# Patient Record
Sex: FEMALE | Race: WHITE | URBAN - METROPOLITAN AREA
[De-identification: names, ages, dates, MRNs, and addresses within clinical notes are randomized per-mention and may not be internally consistent; named-entity substitution may affect disease eponyms.]

---

## 2017-05-02 ENCOUNTER — APPOINTMENT (RX ONLY)
Dept: URBAN - METROPOLITAN AREA CLINIC 24 | Facility: CLINIC | Age: 82
Setting detail: DERMATOLOGY
End: 2017-05-02

## 2017-05-02 DIAGNOSIS — L30.9 DERMATITIS, UNSPECIFIED: ICD-10-CM

## 2017-05-02 DIAGNOSIS — L57.0 ACTINIC KERATOSIS: ICD-10-CM

## 2017-05-02 DIAGNOSIS — L82.1 OTHER SEBORRHEIC KERATOSIS: ICD-10-CM

## 2017-05-02 DIAGNOSIS — L81.4 OTHER MELANIN HYPERPIGMENTATION: ICD-10-CM

## 2017-05-02 DIAGNOSIS — D69.2 OTHER NONTHROMBOCYTOPENIC PURPURA: ICD-10-CM

## 2017-05-02 DIAGNOSIS — L85.3 XEROSIS CUTIS: ICD-10-CM

## 2017-05-02 DIAGNOSIS — D22 MELANOCYTIC NEVI: ICD-10-CM

## 2017-05-02 PROBLEM — D22.5 MELANOCYTIC NEVI OF TRUNK: Status: ACTIVE | Noted: 2017-05-02

## 2017-05-02 PROBLEM — E03.9 HYPOTHYROIDISM, UNSPECIFIED: Status: ACTIVE | Noted: 2017-05-02

## 2017-05-02 PROCEDURE — 99213 OFFICE O/P EST LOW 20 MIN: CPT | Mod: 25

## 2017-05-02 PROCEDURE — ? PRESCRIPTION

## 2017-05-02 PROCEDURE — 17003 DESTRUCT PREMALG LES 2-14: CPT

## 2017-05-02 PROCEDURE — 17000 DESTRUCT PREMALG LESION: CPT

## 2017-05-02 PROCEDURE — ? COUNSELING

## 2017-05-02 PROCEDURE — ? TREATMENT REGIMEN

## 2017-05-02 PROCEDURE — ? LIQUID NITROGEN

## 2017-05-02 RX ORDER — TRIAMCINOLONE ACETONIDE 1 MG/G
CREAM TOPICAL
Qty: 1 | Refills: 3 | Status: ERX

## 2017-05-02 ASSESSMENT — LOCATION SIMPLE DESCRIPTION DERM
LOCATION SIMPLE: LOWER BACK
LOCATION SIMPLE: NOSE
LOCATION SIMPLE: CHEST
LOCATION SIMPLE: RIGHT UPPER BACK
LOCATION SIMPLE: LEFT UPPER BACK
LOCATION SIMPLE: LEFT FOREARM
LOCATION SIMPLE: LEFT POSTERIOR UPPER ARM
LOCATION SIMPLE: RIGHT FOREARM

## 2017-05-02 ASSESSMENT — LOCATION DETAILED DESCRIPTION DERM
LOCATION DETAILED: LEFT DISTAL DORSAL FOREARM
LOCATION DETAILED: RIGHT MEDIAL UPPER BACK
LOCATION DETAILED: LEFT PROXIMAL DORSAL FOREARM
LOCATION DETAILED: LEFT DISTAL POSTERIOR UPPER ARM
LOCATION DETAILED: LEFT LATERAL UPPER BACK
LOCATION DETAILED: RIGHT DISTAL DORSAL FOREARM
LOCATION DETAILED: NASAL DORSUM
LOCATION DETAILED: SUPERIOR LUMBAR SPINE
LOCATION DETAILED: LEFT MEDIAL SUPERIOR CHEST

## 2017-05-02 ASSESSMENT — LOCATION ZONE DERM
LOCATION ZONE: NOSE
LOCATION ZONE: ARM
LOCATION ZONE: TRUNK

## 2017-05-02 NOTE — PROCEDURE: LIQUID NITROGEN
Post-Care Instructions: I reviewed with the patient in detail post-care instructions. Patient is to wear sunprotection, and avoid picking at any of the treated lesions. Pt may apply Vaseline to crusted or scabbing areas.
Duration Of Freeze Thaw-Cycle (Seconds): 4
Detail Level: Simple
Consent: The patient's consent was obtained including but not limited to risks of crusting, scabbing, blistering, scarring, darker or lighter pigmentary change, recurrence, incomplete removal and infection.
Render Post-Care Instructions In Note?: no
Number Of Freeze-Thaw Cycles: 1 freeze-thaw cycle

## 2017-05-22 ENCOUNTER — HOSPITAL ENCOUNTER (OUTPATIENT)
Dept: SURGERY | Age: 82
Discharge: HOME | End: 2017-05-22
Payer: MEDICARE

## 2017-05-22 ENCOUNTER — HOSPITAL ENCOUNTER (OUTPATIENT)
Dept: PHYSICAL THERAPY | Age: 82
Discharge: HOME | End: 2017-05-22
Payer: MEDICARE

## 2017-05-22 VITALS
RESPIRATION RATE: 16 BRPM | TEMPERATURE: 96.2 F | DIASTOLIC BLOOD PRESSURE: 65 MMHG | HEART RATE: 64 BPM | BODY MASS INDEX: 20.99 KG/M2 | HEIGHT: 65 IN | SYSTOLIC BLOOD PRESSURE: 130 MMHG | WEIGHT: 126 LBS | OXYGEN SATURATION: 99 %

## 2017-05-22 LAB
ANION GAP BLD CALC-SCNC: 8 MMOL/L (ref 7–16)
APPEARANCE UR: CLEAR
APTT PPP: 24.8 SEC (ref 23.5–31.7)
BACTERIA SPEC CULT: NORMAL
BASOPHILS # BLD AUTO: 0 K/UL (ref 0–0.2)
BASOPHILS # BLD: 0 % (ref 0–2)
BILIRUB UR QL: NEGATIVE
BUN SERPL-MCNC: 35 MG/DL (ref 8–23)
CALCIUM SERPL-MCNC: 9.9 MG/DL (ref 8.3–10.4)
CHLORIDE SERPL-SCNC: 102 MMOL/L (ref 98–107)
CO2 SERPL-SCNC: 29 MMOL/L (ref 21–32)
COLOR UR: YELLOW
CREAT SERPL-MCNC: 1.47 MG/DL (ref 0.6–1)
DIFFERENTIAL METHOD BLD: NORMAL
EOSINOPHIL # BLD: 0.1 K/UL (ref 0–0.8)
EOSINOPHIL NFR BLD: 2 % (ref 0.5–7.8)
ERYTHROCYTE [DISTWIDTH] IN BLOOD BY AUTOMATED COUNT: 14.3 % (ref 11.9–14.6)
GLUCOSE SERPL-MCNC: 87 MG/DL (ref 65–100)
GLUCOSE UR STRIP.AUTO-MCNC: NEGATIVE MG/DL
HCT VFR BLD AUTO: 38.5 % (ref 35.8–46.3)
HGB BLD-MCNC: 12.4 G/DL (ref 11.7–15.4)
HGB UR QL STRIP: NEGATIVE
IMM GRANULOCYTES # BLD: 0 K/UL (ref 0–0.5)
IMM GRANULOCYTES NFR BLD AUTO: 0.2 % (ref 0–5)
INR PPP: 1 (ref 0.9–1.2)
KETONES UR QL STRIP.AUTO: NEGATIVE MG/DL
LEUKOCYTE ESTERASE UR QL STRIP.AUTO: NEGATIVE
LYMPHOCYTES # BLD AUTO: 33 % (ref 13–44)
LYMPHOCYTES # BLD: 2.1 K/UL (ref 0.5–4.6)
MCH RBC QN AUTO: 29 PG (ref 26.1–32.9)
MCHC RBC AUTO-ENTMCNC: 32.2 G/DL (ref 31.4–35)
MCV RBC AUTO: 90.2 FL (ref 79.6–97.8)
MONOCYTES # BLD: 0.5 K/UL (ref 0.1–1.3)
MONOCYTES NFR BLD AUTO: 8 % (ref 4–12)
NEUTS SEG # BLD: 3.6 K/UL (ref 1.7–8.2)
NEUTS SEG NFR BLD AUTO: 57 % (ref 43–78)
NITRITE UR QL STRIP.AUTO: NEGATIVE
PH UR STRIP: 6.5 [PH] (ref 5–9)
PLATELET # BLD AUTO: 232 K/UL (ref 150–450)
PMV BLD AUTO: 11.5 FL (ref 10.8–14.1)
POTASSIUM SERPL-SCNC: 4.4 MMOL/L (ref 3.5–5.1)
PROT UR STRIP-MCNC: NEGATIVE MG/DL
PROTHROMBIN TIME: 10.3 SEC (ref 9.6–12)
RBC # BLD AUTO: 4.27 M/UL (ref 4.05–5.25)
SERVICE CMNT-IMP: NORMAL
SODIUM SERPL-SCNC: 139 MMOL/L (ref 136–145)
SP GR UR REFRACTOMETRY: 1.01 (ref 1–1.02)
UROBILINOGEN UR QL STRIP.AUTO: 0.2 EU/DL (ref 0.2–1)
WBC # BLD AUTO: 6.4 K/UL (ref 4.3–11.1)

## 2017-05-22 PROCEDURE — 80048 BASIC METABOLIC PNL TOTAL CA: CPT | Performed by: PHYSICIAN ASSISTANT

## 2017-05-22 PROCEDURE — 93005 ELECTROCARDIOGRAM TRACING: CPT | Performed by: ANESTHESIOLOGY

## 2017-05-22 PROCEDURE — 77030027138 HC INCENT SPIROMETER -A

## 2017-05-22 PROCEDURE — G8980 MOBILITY D/C STATUS: HCPCS

## 2017-05-22 PROCEDURE — G8979 MOBILITY GOAL STATUS: HCPCS

## 2017-05-22 PROCEDURE — 97162 PT EVAL MOD COMPLEX 30 MIN: CPT

## 2017-05-22 PROCEDURE — 81003 URINALYSIS AUTO W/O SCOPE: CPT | Performed by: PHYSICIAN ASSISTANT

## 2017-05-22 PROCEDURE — 85025 COMPLETE CBC W/AUTO DIFF WBC: CPT | Performed by: PHYSICIAN ASSISTANT

## 2017-05-22 PROCEDURE — 85730 THROMBOPLASTIN TIME PARTIAL: CPT | Performed by: PHYSICIAN ASSISTANT

## 2017-05-22 PROCEDURE — 87641 MR-STAPH DNA AMP PROBE: CPT | Performed by: PHYSICIAN ASSISTANT

## 2017-05-22 PROCEDURE — G8978 MOBILITY CURRENT STATUS: HCPCS

## 2017-05-22 PROCEDURE — 85610 PROTHROMBIN TIME: CPT | Performed by: PHYSICIAN ASSISTANT

## 2017-05-22 RX ORDER — FERROUS SULFATE, DRIED 160(50) MG
1 TABLET, EXTENDED RELEASE ORAL DAILY
COMMUNITY

## 2017-05-22 NOTE — PERIOP NOTES
Received nephrology note dated 3/30/17 from [facility] and placed on chart to reference DOS if needed.

## 2017-05-22 NOTE — PERIOP NOTES
Patient verified name, , and surgery as listed in [facility]. Type 3 surgery, Joint camp assessment complete. Labs per surgeon: cbc,bmp,pt,ptt,ua,mrsa swab ; results (within anesthesia protocols)  Labs per anesthesia protocol: included in surgeon's orders. EKG:done today - will have MDA review once comparison received from [facility]. Most recent card note (5/15/17) not ready yet under Care Everywhere - will log as a problem chart to review and update pt hx if needed. Most recent [facility] ([phone]) note requested to have if needed DOS. Most recent pacer interrogation under Care Everywhere and on chart (17). Hibiclens and instructions given per hospital policy. Nasal Swab collected per MD order and instructions for Mupirocin nasal ointment if required. Patient provided with handouts including Guide to Surgery, Pain Management, Hand Hygiene, Blood Transfusion Education, and [facility] [facility] Brochure. Patient answered medical/surgical history questions at their best of ability. All prior to admission medications documented in [facility] Care. Original medication prescription bottle were visualized during patient appointment. Patient instructed to hold all vitamins 7 days prior to surgery and NSAIDS 5 days prior to surgery. Medications to be held prior to surgery  - none    Patient instructed to continue previous medications as prescribed prior to surgery and to take the following medications the day of surgery according to anesthesia guidelines with a small sip of water: tyl prn, asa 81 mg, zebeta, synthroid, prilosec, tramadol prn. Patient taught back and verbalized understanding.

## 2017-05-22 NOTE — PROGRESS NOTES
05/22/17 1200   Oxygen Therapy   O2 Sat (%) 98 %   Pulse via Oximetry 77 beats per minute   O2 Device Room air   Pre-Treatment   Breath Sounds Bilateral Clear   Pre FEV1 (liters) 1.2 liters   % Predicted 62   Incentive Spirometry Treatment   Actual Volume (ml) 1000 ml     Initial respiratory Assessment completed with pt. Pt was interviewed and evaluated in Joint camp prior to surgery. Patient ID:  Leanne Dailey  [id number]  80 y.o.  5/4/1930  Surgeon: Dr. Anthony Mock  Date of Surgery: 6/12/2017  Procedure: Total Right Hip Arthroplasty  Primary Care Physician: Elisa Flores [place] [phone]  Specialists:                                  Pt instructed in the use of Incentive Spirometry. Pt instructed to bring Incentive Spirometer back on date of surgery & to start using Is upon return to pt room.     Pt taught proper cough technique      History of smoking:   NONE      Quit date:    Secondhand smoke:FATHER      Past procedures with Oxygen desaturation:NONE    Past Medical History:   Diagnosis Date    Arthritis     Cancer ([facility] [address]     breast (right)    CKD (chronic kidney disease)     Dyslipidemia     GERD (gastroesophageal reflux disease)     Hx: UTI (urinary tract infection)     Hypertension     LBBB (left bundle branch block)     Pacemaker 2009    [facility]    SSS (sick sinus syndrome) ([facility] [address]     Thyroid disease                                                                                                                                                      Respiratory history:                                    DENIES SOB                                  Respiratory meds:                                                         FAMILY PRESENT:             YES       -                                      PAST SLEEP STUDY:              NO  HX OF ELVIA:                              NO                                     ELVIA assessment:                                               SLEEPS ON SIDE PHYSICAL EXAM   Body mass index is 20.97 kg/(m^2).    Visit Vitals    /65 (BP 1 Location: Left arm, BP Patient Position: At rest)    Pulse 64    Temp 96.2 °F (35.7 °C)    Resp 16    Ht 5' 5\" (1.651 m)    Wt 57.2 kg (126 lb)    SpO2 99%    BMI 20.97 kg/m2     Neck circumference: 33.5     cm    Loud snoring: STATES PT SNORES BUT NOT WORRISOME  Witnessed apnea or wakening gasping or choking:,DENIES   Awakens with headaches:DENIES    Morning or daytime tiredness/ sleepiness: DENIES     Dry mouth or sore throat in morning:SOME  Freidman stage:2-3    SACS score:4    STOP/BAN                              CPAP:NA         NONE  Referrals:    Pt. Phone Number:

## 2017-05-22 NOTE — PROGRESS NOTES
Tory Beck  : [birth year](68 y.o.) [facility] at [address], [place] [facility] [address]  [phone]       Physical Therapy Prehab Plan of Treatment and Evaluation Summary:2017    ICD-10: Treatment Diagnosis:   · Pain in Right Hip (M25.551)  · Stiffness of Right Hip, Not elsewhere classified (M25.651)  · Difficulty in walking, Not elsewhere classified (R26.2)  · Other abnormalities of gait and mobility (R26.89)  Precautions/Allergies: Other medication and Tiazac [diltiazem hcl]  MEDICAL/REFERRING DIAGNOSIS:  Unilateral primary osteoarthritis, right hip [M16.11]  REFERRING PHYSICIAN: Jose M Laird., *  DATE OF SURGERY: 17   Assessment:   Comments:  Pain in right hip joint with decreased independence with functional mobility. Pt plans to return home following hospital stay.  present and supportive. PROBLEM LIST (Impacting functional limitations):  Ms. Leila Jimenez presents with the following right lower extremity(s) problems:  1. Transfers  2. Gait  3. Strength  4. Range of Motion  5. Pain   INTERVENTIONS PLANNED:  1. Home Exercise Program  2. Educational Discussion     TREATMENT PLAN: Effective Dates: 2017 TO 2017. Frequency/Duration: Patient to continue to perform home exercise program at least twice per day up until her surgery. GOALS: (Goals have been discussed and agreed upon with patient.)  Discharge Goals: Time Frame: 1 Day  1. Patient will demonstrate independence with a home exercise program designed to increase strength, range of motion and pain control to minimize functional deficits and optimize patient for total joint replacement. Rehabilitation Potential For Stated Goals: Good  Regarding Clayton Skaggs's therapy, I certify that the treatment plan above will be carried out by a therapist or under their direction.   Thank you for this referral,  Martin Vance, PT               HISTORY:   Present Symptoms:  Pain Intensity 1: 0 History of Present Injury/Illness (Reason for Referral):  Medical/Referring Diagnosis: Unilateral primary osteoarthritis, right hip [M16.11]   Past Medical History/Comorbidities:   Ms. Melodie Garza  has a past medical history of Arthritis; Cancer [facility]); CKD (chronic kidney disease); Dyslipidemia; GERD (gastroesophageal reflux disease); UTI (urinary tract infection); Hypertension; LBBB (left bundle branch block); Pacemaker (2009); SSS (sick sinus syndrome) ([address] and Thyroid disease. She also has no past medical history of Adverse effect of anesthesia; Aneurysm ([address] Asthma; Autoimmune disease ([facility] [address] CAD (coronary artery disease); Chronic obstructive pulmonary disease ([address] Coagulation disorder ([facility] [address] Diabetes ([address] Difficult intubation; Endocarditis; Heart failure ([address] Liver disease; Malignant hyperthermia due to anesthesia; Nausea & vomiting; Nicotine vapor product user; Non-nicotine vapor product user; Pseudocholinesterase deficiency; Psychiatric disorder; PUD (peptic ulcer disease); Rheumatic fever; Seizures ([address] Sleep apnea; Stroke [facility]); or Thromboembolus ([address] Ms. Melodie Garza  has a past surgical history that includes pacemaker; hysterectomy (01/1974); hernia repair (05/24/2011); cataract removal (Bilateral); breast surgery procedure unlisted; bunionectomy (07/28/2006); knee replacement (Bilateral); thyroidectomy (2009); knee arthroscopy (Bilateral); carpal tunnel release (Left, 2003); heart catheterization (09/2001); and pacemaker placement (07/15/2009).   Social History/Living Environment:   Home Environment: Private residence  # Steps to Enter: 2  One/Two Story Residence: Two story, live on 1st floor  # of Interior Steps: 16  Support Systems: Spouse/Significant Other/Partner  Patient Expects to be Discharged to[employer] residence  Current DME Used/Available at [facility]: Shower chair, Commode, bedside, Walker, rolling, Cane, straight  Tub or Shower Type: Tub  Work/Activity:  retired  Dominant Side:  RIGHT  Current Medications:  See Pre-assessment nursing note   Number of Personal Factors/Comorbidities that affect the Plan of Care: 1-2: MODERATE COMPLEXITY   EXAMINATION:   ADLs (Current Functional Status):   Ambulation:  [] Independent  [] Walk Indoors Only  [x] Walk Outdoors  [x] Use Assistive Device walker  [] Use Wheelchair Only Dressing:  [x] Independent  Requires Assistance from Someone for:  [] Sock/Shoes  [] Pants  [] Everything   Bathing/Showering:   [] Independent  [x] Requires Assistance from Someone  [] Sponge Bath Only Household Activities:  [] Routine house and yard work  [x] Light Housework Only  [] None   Observation/Orthostatic Postural Assessment:   Within defined limits   ROM/Flexibility:   Gross Assessment: Yes  AROM: Generally decreased, functional                LLE Assessment  LLE Assessment (WDL): Within defined limits      RLE AROM  R Hip Flexion: 90   Strength:   Gross Assessment: Yes  Strength: Generally decreased, functional                  Functional Mobility:    Gross Assessment: Yes  Coordination: Generally decreased, functional    Gait Description (WDL): Within defined limits  Stand to Sit: Independent  Sit to Stand: Independent             Balance:    Sitting: Intact  Standing: Intact   Body Structures Involved:  1. Bones  2. Joints  3. Muscles Body Functions Affected:  1. Neuromusculoskeletal  2. Movement Related Activities and Participation Affected:  1. Mobility   Number of elements that affect the Plan of Care: 4+: HIGH COMPLEXITY   CLINICAL PRESENTATION:   Presentation: Stable and uncomplicated: LOW COMPLEXITY   CLINICAL DECISION MAKING:   Outcome Measure: Tool Used: Lower Extremity Functional Scale (LEFS)  Score:  Initial: 13/80 Most Recent: X/80 (Date: -- )   Interpretation of Score: 20 questions each scored on a 5 point scale with 0 representing \"extreme difficulty or unable to perform\" and 4 representing \"no difficulty\". The lower the score, the greater the functional disability. 80/80 represents no disability. Minimal detectable change is 9 points. Score 80 79-65 64-49 48-33 32-17 16-1 0   Modifier CH CI CJ CK CL CM CN     ? Mobility - Walking and Moving Around:     - CURRENT STATUS: CM - 80%-99% impaired, limited or restricted    - GOAL STATUS: CM - 80%-99% impaired, limited or restricted    - D/C STATUS:  CM - 80%-99% impaired, limited or restricted  Medical Necessity:   · Ms. Syeda Blank is expected to optimize her lower extremity strength and ROM in preparation for joint replacement surgery. Reason for Services/Other Comments:  · Achieve baseline assesment of musculoskeletal system, functional mobility and home environment. , educate in PT HEP in preparation for surgery, educate in hospital plan of care. Use of outcome tool(s) and clinical judgement create a POC that gives a: Questionable prediction of patient's progress: MODERATE COMPLEXITY   TREATMENT:   Treatment/Session Assessment:  Patient was instructed in PT- HEP to increase strength and ROM in LEs. Answered all questions. · Post session pain:  9  · Compliance with Program/Exercises: compliant most of the time.   Total Treatment Duration:  PT Patient Time In/Time Out  Time In: 1300  Time Out: 63 Savanna Bergman PT

## 2017-05-22 NOTE — ADVANCED PRACTICE NURSE
Total Joint Surgery Preoperative Chart Review      Patient ID:  Taniya Roach  [id number]  80 y.o.  5/4/1930  Surgeon: Dr. Davidson Almeida  Date of Surgery: 6/12/2017  Procedure: Total Right Hip Arthroplasty  Primary Care Physician: Tatiana Nunez [phone]  Specialty Physician(s):      Subjective:   Taniya Roach is a 80 y.o. WHITE OR  female who presents for preoperative evaluation for Total Right Hip arthroplasty. This is a preoperative chart review note based on data collected by the nurse at the surgical Pre-Assessment visit. Past Medical History:   Diagnosis Date    Arthritis     Cancer [facility])     breast (right)    CKD (chronic kidney disease)     Dyslipidemia     GERD (gastroesophageal reflux disease)     Hx: UTI (urinary tract infection)     Hypertension     LBBB (left bundle branch block)     [facility]emaker 2009    [facility]    SSS (sick sinus syndrome) ([facility] [address]     Thyroid disease       Past Surgical History:   Procedure Laterality Date    BREAST SURGERY PROCEDURE UNLISTED      x 3 from [id number] for breast cancer    HX BUNIONECTOMY  07/28/2006    HX CARPAL TUNNEL RELEASE Left 2003    HX CATARACT REMOVAL Bilateral     1/7/2003, 2/25/2003    HX HEART CATHETERIZATION  09/2001    HX HERNIA REPAIR  05/24/2011    HX HYSTERECTOMY  01/1974    HX KNEE ARTHROSCOPY Bilateral     HX KNEE REPLACEMENT Bilateral     right (2/2005) and left (2/2006)    HX PACEMAKER      HX PACEMAKER PLACEMENT  07/15/2009    HX THYROIDECTOMY  2009     Family History   Problem Relation Age of Onset    Heart Disease Mother       Social History   Substance Use Topics    Smoking status: Never Smoker    Smokeless tobacco: Not on file    Alcohol use No       Prior to Admission medications    Medication Sig Start Date End Date Taking? Authorizing Provider   calcium-vitamin D (OYSTER SHELL) 500 mg(1,250mg) -200 unit per tablet Take 1 Tab by mouth daily.    Yes Historical Provider   ACETAMINOPHEN (TYLENOL PO) Take  by mouth as needed. Take / use AM day of surgery  per anesthesia protocols if needed. Yes Historical Provider   levothyroxine (SYNTHROID) 100 mcg tablet Take 100 mcg by mouth Daily (before breakfast). Take / use AM day of surgery  per anesthesia protocols. Yes Historical Provider   amLODIPine-benazepril (LOTREL) 10-20 mg per capsule Take 1 capsule by mouth daily. Yes Historical Provider   traMADol (ULTRAM) 50 mg tablet Take 50 mg by mouth every six (6) hours as needed for Pain. Take / use AM day of surgery  per anesthesia protocols if needed. Yes Historical Provider   pravastatin (PRAVACHOL) 10 mg tablet Take 80 mg by mouth nightly. Yes Historical Provider   omeprazole (PRILOSEC) 20 mg capsule Take 20 mg by mouth daily. Take / use AM day of surgery  per anesthesia protocols. Yes Historical Provider   alendronate (FOSAMAX) 70 mg tablet Take 70 mg by mouth Every Saturday. 12/18/10  Yes Historical Provider   bisoprolol (ZEBETA) 10 mg tablet Take 10 mg by mouth daily. Take / use AM day of surgery  per anesthesia protocols. Yes Historical Provider   aspirin 81 mg tablet Take 81 mg by mouth daily. Take / use AM day of surgery  per anesthesia protocols. 12/13/10  Yes Historical Provider   ezetimibe (ZETIA) 10 mg tablet Take 10 mg by mouth every evening.  12/13/10  Yes Historical Provider     Allergies   Allergen Reactions    Other Medication Rash     Arthritis medication    Tiazac [Diltiazem Hcl] Rash          Objective:     Physical Exam:   Patient Vitals for the past 24 hrs:   Temp Pulse Resp BP SpO2   05/22/17 1348 96.2 °F (35.7 °C) 64 16 130/65 99 %       ECG:    EKG Results     None          Data Review:   Labs:   Recent Results (from the past 24 hour(s))   CBC WITH AUTOMATED DIFF    Collection Time: 05/22/17 12:10 PM   Result Value Ref Range    WBC 6.4 4.3 - 11.1 K/uL    RBC 4.27 4.05 - 5.25 M/uL    HGB 12.4 11.7 - 15.4 g/dL    HCT 38.5 35.8 - 46.3 %    MCV 90.2 79.6 - 97.8 FL    MCH 29.0 26.1 - 32.9 PG MCHC 32.2 31.4 - 35.0 g/dL    RDW 14.3 11.9 - 14.6 %    PLATELET 432 154 - 594 K/uL    MPV 11.5 10.8 - 14.1 FL    DF AUTOMATED      NEUTROPHILS 57 43 - 78 %    LYMPHOCYTES 33 13 - 44 %    MONOCYTES 8 4.0 - 12.0 %    EOSINOPHILS 2 0.5 - 7.8 %    BASOPHILS 0 0.0 - 2.0 %    IMMATURE GRANULOCYTES 0.2 0.0 - 5.0 %    ABS. NEUTROPHILS 3.6 1.7 - 8.2 K/UL    ABS. LYMPHOCYTES 2.1 0.5 - 4.6 K/UL    ABS. MONOCYTES 0.5 0.1 - 1.3 K/UL    ABS. EOSINOPHILS 0.1 0.0 - 0.8 K/UL    ABS. BASOPHILS 0.0 0.0 - 0.2 K/UL    ABS. IMM. GRANS. 0.0 0.0 - 0.5 K/UL   PROTHROMBIN TIME + INR    Collection Time: 05/22/17 12:10 PM   Result Value Ref Range    Prothrombin time 10.3 9.6 - 12.0 sec    INR 1.0 0.9 - 1.2     PTT    Collection Time: 05/22/17 12:10 PM   Result Value Ref Range    aPTT 24.8 23.5 - 45.8 SEC   METABOLIC PANEL, BASIC    Collection Time: 05/22/17 12:10 PM   Result Value Ref Range    Sodium 139 136 - 145 mmol/L    Potassium 4.4 3.5 - 5.1 mmol/L    Chloride 102 98 - 107 mmol/L    CO2 29 21 - 32 mmol/L    Anion gap 8 7 - 16 mmol/L    Glucose 87 65 - 100 mg/dL    BUN 35 (H) 8 - 23 MG/DL    Creatinine 1.47 (H) 0.6 - 1.0 MG/DL    GFR est AA 43 (L) >60 ml/min/1.73m2    GFR est non-AA 36 (L) >60 ml/min/1.73m2    Calcium 9.9 8.3 - 10.4 MG/DL   URINALYSIS W/ RFLX MICROSCOPIC    Collection Time: 05/22/17 12:18 PM   Result Value Ref Range    Color YELLOW      Appearance CLEAR      Specific gravity 1.013 1.001 - 1.023      pH (UA) 6.5 5.0 - 9.0      Protein NEGATIVE  NEG mg/dL    Glucose NEGATIVE  mg/dL    Ketone NEGATIVE  NEG mg/dL    Bilirubin NEGATIVE  NEG      Blood NEGATIVE  NEG      Urobilinogen 0.2 0.2 - 1.0 EU/dL    Nitrites NEGATIVE  NEG      Leukocyte Esterase NEGATIVE  NEG           Problem List:  )There is no problem list on file for this patient. Total Joint Surgery Pre-Assessment Recommendations:           Renal protocol initiated. The patient's chart will be flagged renal risk. Renal RisK Alerts:  1.  Caution with use of muscle relaxants and sedatives with reduced renal function  2. Caution with total amount of narcotics used   3. Avoid morphine if patient has reduced renal function due to accumulations of the highly active metabolite, morphine-6-glucuronide, which can lead to sedation and respiratory depression  4. Avoid nephrotoxic drugs such as CALDERON inhibitors  5. Consider volume status monitoring in addition to Dickinson  6.  Ensure hand-off to hospitalist for appropriate perioperative IV fluid management        Signed By: Phoebe Corrigan NP-C    May 22, 2017

## 2017-05-23 LAB
ATRIAL RATE: 65 BPM
CALCULATED P AXIS, ECG09: 64 DEGREES
CALCULATED R AXIS, ECG10: 13 DEGREES
CALCULATED T AXIS, ECG11: 56 DEGREES
DIAGNOSIS, 93000: NORMAL
P-R INTERVAL, ECG05: 216 MS
Q-T INTERVAL, ECG07: 434 MS
QRS DURATION, ECG06: 88 MS
QTC CALCULATION (BEZET), ECG08: 451 MS
VENTRICULAR RATE, ECG03: 65 BPM

## 2017-05-23 NOTE — PERIOP NOTES
Pati Talley, anesthesia, reviewed and ok'd for surgery stress (1/27/16), cath (9/25/01), ekg's (5/22/17, 5/7/12) - no new orders received. OK to proceed.

## 2017-06-01 NOTE — H&P
[id number] [facility]  Pre Operative History and Physical Exam    Patient ID:  Sahra Post  [id number]  63 y.o.  5/4/1930    Today: June 1, 2017       Assessment:   1. Arthritis of the Right Hip    I have reviewed the patient's controlled substance prescription history, as maintained in the [place] prescription monitoring program, so that the prescription/s for a controlled substance can be given. Plan:    1. Proceed with scheduled Procedure(s) (LRB):  RIGHT HIP ARTHROPLASTY TOTAL / DEPUY (Right)            CC:  Right hip pain    HPI:   The patient has end stage arthritis of the right hip. The patient was evaluated and examined during a consultation prior to this office visit. There have been no changes to the patient's orthopedic condition since the initial consultation. The patient has failed previous conservative treatment for this condition including antiinflammatories , and lifestyle modifications. The necessity for joint replacement is present.  The patient will be admitted the day of surgery for Procedure(s) (LRB):  RIGHT HIP ARTHROPLASTY TOTAL / Ghada Live (Right)      Past Medical/Surgical History:  Past Medical History:   Diagnosis Date    Arthritis     Cancer ([address]     breast (right)    CKD (chronic kidney disease)     Dyslipidemia     GERD (gastroesophageal reflux disease)     Hx: UTI (urinary tract infection)     Hypertension     LBBB (left bundle branch block)     Pacemaker 2009    [facility]    SSS (sick sinus syndrome) ([address]     Thyroid disease      Past Surgical History:   Procedure Laterality Date    BREAST SURGERY PROCEDURE UNLISTED      x 3 from [id number] for breast cancer    HX BUNIONECTOMY  07/28/2006    HX CARPAL TUNNEL RELEASE Left 2003    HX CATARACT REMOVAL Bilateral     1/7/2003, 2/25/2003    HX HEART CATHETERIZATION  09/2001    HX HERNIA REPAIR  05/24/2011    HX HYSTERECTOMY  01/1974    HX KNEE ARTHROSCOPY Bilateral     HX KNEE REPLACEMENT Bilateral right (2/2005) and left (2/2006)    HX PACEMAKER      HX PACEMAKER PLACEMENT  07/15/2009    HX THYROIDECTOMY  2009        Allergies: Allergies   Allergen Reactions    Other Medication Rash     Arthritis medication    Tiazac [Diltiazem Hcl] Rash        Objective:                    HEENT: NC/AT                   Lungs:  Unlabored respirations, clear breath sounds                    Heart:   RRR                    Abdomen: soft                   Extremities:  Pain with ROM of the right hip    Meds:   No current facility-administered medications for this encounter. Current Outpatient Prescriptions   Medication Sig    calcium-vitamin D (OYSTER SHELL) 500 mg(1,250mg) -200 unit per tablet Take 1 Tab by mouth daily.  ACETAMINOPHEN (TYLENOL PO) Take  by mouth as needed. Take / use AM day of surgery  per anesthesia protocols if needed.  levothyroxine (SYNTHROID) 100 mcg tablet Take 100 mcg by mouth Daily (before breakfast). Take / use AM day of surgery  per anesthesia protocols.  amLODIPine-benazepril (LOTREL) 10-20 mg per capsule Take 1 capsule by mouth daily.  traMADol (ULTRAM) 50 mg tablet Take 50 mg by mouth every six (6) hours as needed for Pain. Take / use AM day of surgery  per anesthesia protocols if needed.  pravastatin (PRAVACHOL) 10 mg tablet Take 80 mg by mouth nightly.  omeprazole (PRILOSEC) 20 mg capsule Take 20 mg by mouth daily. Take / use AM day of surgery  per anesthesia protocols.  alendronate (FOSAMAX) 70 mg tablet Take 70 mg by mouth Every Saturday.  bisoprolol (ZEBETA) 10 mg tablet Take 10 mg by mouth daily. Take / use AM day of surgery  per anesthesia protocols.  aspirin 81 mg tablet Take 81 mg by mouth daily. Take / use AM day of surgery  per anesthesia protocols.  ezetimibe (ZETIA) 10 mg tablet Take 10 mg by mouth every evening.          Labs:  Hospital Outpatient Visit on 05/22/2017   Component Date Value Ref Range Status    WBC 05/22/2017 6.4  4.3 - 11.1 K/uL Final    RBC 05/22/2017 4.27  4.05 - 5.25 M/uL Final    HGB 05/22/2017 12.4  11.7 - 15.4 g/dL Final    HCT 05/22/2017 38.5  35.8 - 46.3 % Final    MCV 05/22/2017 90.2  79.6 - 97.8 FL Final    MCH 05/22/2017 29.0  26.1 - 32.9 PG Final    MCHC 05/22/2017 32.2  31.4 - 35.0 g/dL Final    RDW 05/22/2017 14.3  11.9 - 14.6 % Final    PLATELET 07/96/7081 686  150 - 450 K/uL Final    MPV 05/22/2017 11.5  10.8 - 14.1 FL Final    DF 05/22/2017 AUTOMATED    Final    NEUTROPHILS 05/22/2017 57  43 - 78 % Final    LYMPHOCYTES 05/22/2017 33  13 - 44 % Final    MONOCYTES 05/22/2017 8  4.0 - 12.0 % Final    EOSINOPHILS 05/22/2017 2  0.5 - 7.8 % Final    BASOPHILS 05/22/2017 0  0.0 - 2.0 % Final    IMMATURE GRANULOCYTES 05/22/2017 0.2  0.0 - 5.0 % Final    ABS. NEUTROPHILS 05/22/2017 3.6  1.7 - 8.2 K/UL Final    ABS. LYMPHOCYTES 05/22/2017 2.1  0.5 - 4.6 K/UL Final    ABS. MONOCYTES 05/22/2017 0.5  0.1 - 1.3 K/UL Final    ABS. EOSINOPHILS 05/22/2017 0.1  0.0 - 0.8 K/UL Final    ABS. BASOPHILS 05/22/2017 0.0  0.0 - 0.2 K/UL Final    ABS. IMM. GRANS. 05/22/2017 0.0  0.0 - 0.5 K/UL Final    Prothrombin time 05/22/2017 10.3  9.6 - 12.0 sec Final    INR 05/22/2017 1.0  0.9 - 1.2   Final    Comment: Suggested therapeutic INR range:  Venous thrombosis and embolus  2.0-3.0  Prosthetic heart valve         2.5-3.5      aPTT 05/22/2017 24.8  23.5 - 31.7 SEC Final    Heparin Therapeutic Range = 56.6-81.7 secs    Sodium 05/22/2017 139  136 - 145 mmol/L Final    Potassium 05/22/2017 4.4  3.5 - 5.1 mmol/L Final    Chloride 05/22/2017 102  98 - 107 mmol/L Final    CO2 05/22/2017 29  21 - 32 mmol/L Final    Anion gap 05/22/2017 8  7 - 16 mmol/L Final    Glucose 05/22/2017 87  65 - 100 mg/dL Final    Comment: 47 - 60 mg/dl Consistent with, but not fully diagnostic of hypoglycemia.   101 - 125 mg/dl Impaired fasting glucose/consistent with pre-diabetes mellitus  > 126 mg/dl Fasting glucose consistent with overt diabetes mellitus      BUN 05/22/2017 35* 8 - 23 MG/DL Final    Creatinine 05/22/2017 1.47* 0.6 - 1.0 MG/DL Final    GFR est AA 05/22/2017 43* >60 ml/min/1.73m2 Final    GFR est non-AA 05/22/2017 36* >60 ml/min/1.73m2 Final    Comment: (NOTE)  Estimated GFR is calculated using the Modification of Diet in Renal   Disease (MDRD) Study equation, reported for both  Americans   (GFRAA) and non- Americans (GFRNA), and normalized to 1.73m2   body surface area. The physician must decide which value applies to   the patient. The MDRD study equation should only be used in   individuals age 25 or older. It has not been validated for the   following: pregnant women, patients with serious comorbid conditions,   or on certain medications, or persons with extremes of body size,   muscle mass, or nutritional status.  Calcium 05/22/2017 9.9  8.3 - 10.4 MG/DL Final    Color 05/22/2017 YELLOW    Final    Appearance 05/22/2017 CLEAR    Final    Specific gravity 05/22/2017 1.013  1.001 - 1.023   Final    pH (UA) 05/22/2017 6.5  5.0 - 9.0   Final    Protein 05/22/2017 NEGATIVE   NEG mg/dL Final    Glucose 05/22/2017 NEGATIVE   mg/dL Final    Ketone 05/22/2017 NEGATIVE   NEG mg/dL Final    Bilirubin 05/22/2017 NEGATIVE   NEG   Final    Blood 05/22/2017 NEGATIVE   NEG   Final    Urobilinogen 05/22/2017 0.2  0.2 - 1.0 EU/dL Final    Nitrites 05/22/2017 NEGATIVE   NEG   Final    Leukocyte Esterase 05/22/2017 NEGATIVE   NEG   Final    Special Requests: 05/22/2017 NO SPECIAL REQUESTS    Final    Culture result: 05/22/2017 SA target not detected. A MRSA NEGATIVE, SA NEGATIVE test result does not preclude MRSA or SA nasal colonization.     Final    Ventricular Rate 05/22/2017 65  BPM Final    Atrial Rate 05/22/2017 65  BPM Final    P-R Interval 05/22/2017 216  ms Final    QRS Duration 05/22/2017 88  ms Final    Q-T Interval 05/22/2017 434  ms Final    QTC Calculation (Bezet) 05/22/2017 451  ms Final    Calculated P Axis 05/22/2017 64  degrees Final    Calculated R Axis 05/22/2017 13  degrees Final    Calculated T Axis 05/22/2017 56  degrees Final    Diagnosis 05/22/2017    Final                    Value:!!! Poor data quality, interpretation may be adversely affected  Sinus rhythm with 1st degree A-V block  Possible Left atrial enlargement  Low voltage QRS  Cannot rule out Anterior infarct , age undetermined  Abnormal ECG  When compared with ECG of 25-JUL-2006 10:58,  Minimal criteria for Anterior infarct are now Present  Confirmed by ST CHELLE ATKINSON MD (), MARCO CARLSON ([id number]) on 5/23/2017 6:40:32 AM                   There is no problem list on file for this patient.         Signed By: KAYLA Sutton  June 1, 2017

## 2017-06-01 NOTE — H&P
????? Insurance: Medicare; [employer] of [address]      Office Visit: [id number] EST Level 2 Diagnosis: O48.274 Status post total left knee replacement   M16.11 Primary osteoarthritis of right hip   Z96.651 Status post total right knee replacement   Z09 Follow-up exam   S32. 9XXA Fracture of unspecified parts of lumbosacral spine and pelvis, initial encounter for closed fracture    Proc 1: ?????  Laterality:????? Med 1: ????? Proc 2: ?????  Laterality:????? Med 2: ????? FX 1: ?????           Laterality:  ????? FX2: ?????            Laterality: ????? Casting: ? ???? Cast Supply: ? ????   DME: ????? Laterality: ? ??? DME: ????? Laterality: ? ???   XRAY RIGHT: ?????   LEFT: ????? BILAT: ????? Follow up: Surgery   ?????        BMI:  25.3  Date of Service: 2017-03-14  Work Status:  ????? Allergies:  Cephalexin (unknown); Keflex (unknown); Potassimin (unknown); Tiazac  Medications:Aspirin;Calcium + D;Diovan; Fosamax; Lotrel; Omeprazole;Pravachol;PredniSONE ([place]amonte) (10 MG, take by mouth per package instructions x 12 days); Synthroid;TraMADol HCl (50 MG, Take 1 tablet(s) by mouth every 4-6 hours as needed [PRN] NTE 6 in 24 hrs);Zebeta;Zetia    CC: ?????    11 years on the left and 12 years on the right Follow Up Bilateral TKA Tomás    History:  The patient returns today for 11 years on the left and 12 years on the right follow-up for their bilateral TKA. The patient is doing very well in regard to their bilateral knees and having no discomfort or pain. They are ambulating with no assistive device. PMH:  The [facility] patient health form was updated by the patient, reviewed and signed. ROS:  Noted on the patient form. Pertinent positives and negatives are addressed with the patient, particularly those related to musculoskeletal concerns. Non-orthopaedic concerns were referred back to the primary care physician.     Physical Exam: Bilateral Knees  On exam today, the incisions are well healed; ROM is good, 0 to 130 degrees. No effusion present. No instability to varus/valgus stress, no A/P instability. Good quad strength. No extensor lag. No flexion contracture. Patient walks with no appreciable limp. X-Rays:  AP, lateral and sunrise view of the bilateral knees reveals cemented bilateral TKA, Tomás. Good alignment and good cement technique. No evidence of loosening. X-Ray Diagnosis: SATISFACTORY APPEARANCE BILATERAL TKA    Disposition:  The patient is doing well following bilateral TKA, Tomás. They are to continue with their present level of activity and to return here in 2-3 years for follow-up. ADDENDUM    CC:  Right hip and leg pain. HISTORY:  Ms. Mauricio Bansal is an 80-YO female who is seen today on referral from Dr. Felicia Vail. She was seen by   Dr. Felicia Vail back on the 24th of January. She was seen for pain and discomfort in her right leg. She had previously been seen for problems with her lower back. She related the increased pain and discomfort in the right leg from a fall at home about 2 weeks prior to that. In fact, she has had several falls recently. She is now on a walker. Dr. Felicia Vail saw her and diagnosed her appropriately with a fracture of the right pubic and ischial rami as well as OA of the right hip. She was referred here for evaluation and treatment. The pain and discomfort in the right leg has remained about the same. She has pain associated with standing and walking. She has difficulty lifting the right leg. She has some pain and discomfort in both legs which has been attributed to significant spinal stenosis. That pain she experiences both at rest and night and she experiences it in both legs and her knees. She is becoming progressively more limited in her activities. PMH:  The [facility] Patient Health Form was filled out by the patient, reviewed with the patient and signed. This included a list of current medications, drug allergies, current medical problems, FH, SH, and ROS.   Those symptoms and problems related to HPI were discussed with the patient. All others are being managed by PCP. PE:  On exam today, the patient is alert and oriented x 3. She is ambulatory with a walker, walks with a right antalgic gait. There is no tenderness to palpation over the lower lumbar spine. There is negative SLR bilaterally, negative stretch, and a negative popliteal compression test.  Positive SLR on the left side, positive stretch, and positive popliteal compression test.  There is minimal tenderness to palpation about the right hip. There is restriction in the ROM of the right hip and marked pain, especially with attempt at internal/external rotation. On examination of the right knee, the skin is intact. There is no effusion present. There is good ROM, 0 to 125 degrees. Calf is soft, nontender. NV exam is normal.     X-RAYS:  AP pelvis, lateral right hip reveals a fracture of the right superior ramus. There is probably an associated fracture of the right ischium, although not well visualized. She has severe OA of the right hip. X-RAY DIAGNOSES: 1) Severe OA of the right hip. 2) Fracture of right pubic and ischial rami. DIAGNOSES: 1) SEVERE OA RIGHT HIP. 2) RIGHT PUBIC AND ISCHIAL RAMI FRACTURES. 3) DDD LUMBAR SPINE W/ CHRONIC LEG PAIN. MEDICAL DECISION-MAKING:  The patient is having pain and discomfort in the right hip. She probably still is having some pain related to the right pubic and ischial rami fractures, although those should be healing as it is almost 2 months following the fractures. I believe most of her pain is coming from the arthritic right hip and treatment options include continued conservative care with ambulation with a walker or considering right total hip replacement. I presented both treatment options to the patient. She would require medical clearance and then we would decide whether to proceed with right total hip replacement.   I did discuss the surgical procedure, surgical risks, and rehab time with the patient. She understands these and will consider her options. She was given total hip literature. She has a handicap parking sticker.        Electronically Signed By Arturo Blankenship MD

## 2017-06-09 ENCOUNTER — ANESTHESIA EVENT (OUTPATIENT)
Dept: SURGERY | Age: 82
DRG: 470 | End: 2017-06-09
Payer: MEDICARE

## 2017-06-12 ENCOUNTER — ANESTHESIA (OUTPATIENT)
Dept: SURGERY | Age: 82
DRG: 470 | End: 2017-06-12
Payer: MEDICARE

## 2017-06-12 ENCOUNTER — APPOINTMENT (OUTPATIENT)
Dept: GENERAL RADIOLOGY | Age: 82
DRG: 470 | End: 2017-06-12
Payer: MEDICARE

## 2017-06-12 ENCOUNTER — HOSPITAL ENCOUNTER (INPATIENT)
Age: 82
LOS: 4 days | Discharge: NURSING, REHAB OR LONG-TERM CARE | DRG: 470 | End: 2017-06-16
Attending: ORTHOPAEDIC SURGERY | Admitting: ORTHOPAEDIC SURGERY
Payer: MEDICARE

## 2017-06-12 DIAGNOSIS — Z96.641 STATUS POST RIGHT HIP REPLACEMENT: Primary | ICD-10-CM

## 2017-06-12 LAB
GLUCOSE BLD STRIP.AUTO-MCNC: 95 MG/DL (ref 65–100)
HGB BLD-MCNC: 8.3 G/DL (ref 11.7–15.4)

## 2017-06-12 PROCEDURE — 74011000250 HC RX REV CODE- 250: Performed by: ANESTHESIOLOGY

## 2017-06-12 PROCEDURE — 77030011640 HC PAD GRND REM COVD -A: Performed by: ORTHOPAEDIC SURGERY

## 2017-06-12 PROCEDURE — 72170 X-RAY EXAM OF PELVIS: CPT

## 2017-06-12 PROCEDURE — C1776 JOINT DEVICE (IMPLANTABLE): HCPCS | Performed by: ORTHOPAEDIC SURGERY

## 2017-06-12 PROCEDURE — 77030002966 HC SUT PDS J&J -A: Performed by: ORTHOPAEDIC SURGERY

## 2017-06-12 PROCEDURE — 77030034849: Performed by: ORTHOPAEDIC SURGERY

## 2017-06-12 PROCEDURE — 74011250636 HC RX REV CODE- 250/636: Performed by: ORTHOPAEDIC SURGERY

## 2017-06-12 PROCEDURE — 77030012890

## 2017-06-12 PROCEDURE — 77030006777 HC BLD SAW OSC CNMD -B: Performed by: ORTHOPAEDIC SURGERY

## 2017-06-12 PROCEDURE — 77030008459 HC STPLR SKN COOP -B: Performed by: ORTHOPAEDIC SURGERY

## 2017-06-12 PROCEDURE — 74011250636 HC RX REV CODE- 250/636: Performed by: ANESTHESIOLOGY

## 2017-06-12 PROCEDURE — 77030008467 HC STPLR SKN COVD -B: Performed by: ORTHOPAEDIC SURGERY

## 2017-06-12 PROCEDURE — 77030031139 HC SUT VCRL2 J&J -A: Performed by: ORTHOPAEDIC SURGERY

## 2017-06-12 PROCEDURE — 77030007880 HC KT SPN EPDRL BBMI -B: Performed by: ANESTHESIOLOGY

## 2017-06-12 PROCEDURE — 74011000250 HC RX REV CODE- 250: Performed by: ORTHOPAEDIC SURGERY

## 2017-06-12 PROCEDURE — 77030018836 HC SOL IRR NACL ICUM -A: Performed by: ORTHOPAEDIC SURGERY

## 2017-06-12 PROCEDURE — 86901 BLOOD TYPING SEROLOGIC RH(D): CPT | Performed by: PHYSICIAN ASSISTANT

## 2017-06-12 PROCEDURE — 85018 HEMOGLOBIN: CPT | Performed by: PHYSICIAN ASSISTANT

## 2017-06-12 PROCEDURE — 77030018547 HC SUT ETHBND1 J&J -B: Performed by: ORTHOPAEDIC SURGERY

## 2017-06-12 PROCEDURE — 77030019557 HC ELECTRD VES SEAL MEDT -F: Performed by: ORTHOPAEDIC SURGERY

## 2017-06-12 PROCEDURE — 76210000006 HC OR PH I REC 0.5 TO 1 HR: Performed by: ORTHOPAEDIC SURGERY

## 2017-06-12 PROCEDURE — 65270000029 HC RM PRIVATE

## 2017-06-12 PROCEDURE — 74011000250 HC RX REV CODE- 250

## 2017-06-12 PROCEDURE — 77010033678 HC OXYGEN DAILY

## 2017-06-12 PROCEDURE — 74011250636 HC RX REV CODE- 250/636: Performed by: PHYSICIAN ASSISTANT

## 2017-06-12 PROCEDURE — 86923 COMPATIBILITY TEST ELECTRIC: CPT | Performed by: PHYSICIAN ASSISTANT

## 2017-06-12 PROCEDURE — 97161 PT EVAL LOW COMPLEX 20 MIN: CPT

## 2017-06-12 PROCEDURE — 74011250636 HC RX REV CODE- 250/636

## 2017-06-12 PROCEDURE — 97165 OT EVAL LOW COMPLEX 30 MIN: CPT

## 2017-06-12 PROCEDURE — 77030012935 HC DRSG AQUACEL BMS -B: Performed by: ORTHOPAEDIC SURGERY

## 2017-06-12 PROCEDURE — 76060000034 HC ANESTHESIA 1.5 TO 2 HR: Performed by: ORTHOPAEDIC SURGERY

## 2017-06-12 PROCEDURE — 77030013727 HC IRR FAN PULSVC ZIMM -B: Performed by: ORTHOPAEDIC SURGERY

## 2017-06-12 PROCEDURE — 77030003665 HC NDL SPN BBMI -A: Performed by: ANESTHESIOLOGY

## 2017-06-12 PROCEDURE — 77030032490 HC SLV COMPR SCD KNE COVD -B

## 2017-06-12 PROCEDURE — 74011000258 HC RX REV CODE- 258: Performed by: ORTHOPAEDIC SURGERY

## 2017-06-12 PROCEDURE — 77030020782 HC GWN BAIR PAWS FLX 3M -B: Performed by: ANESTHESIOLOGY

## 2017-06-12 PROCEDURE — 94760 N-INVAS EAR/PLS OXIMETRY 1: CPT

## 2017-06-12 PROCEDURE — 82962 GLUCOSE BLOOD TEST: CPT

## 2017-06-12 PROCEDURE — 0SR904A REPLACEMENT OF RIGHT HIP JOINT WITH CERAMIC ON POLYETHYLENE SYNTHETIC SUBSTITUTE, UNCEMENTED, OPEN APPROACH: ICD-10-PCS | Performed by: ORTHOPAEDIC SURGERY

## 2017-06-12 PROCEDURE — 76010000161 HC OR TIME 1 TO 1.5 HR INTENSV-TIER 1: Performed by: ORTHOPAEDIC SURGERY

## 2017-06-12 DEVICE — LINER ACET OD52MM ID36MM HIP ALTRX PINN: Type: IMPLANTABLE DEVICE | Site: HIP | Status: FUNCTIONAL

## 2017-06-12 DEVICE — HEAD FEM DIA36MM +1.5MM OFFSET 12/14 TAPR HIP CERAMIC: Type: IMPLANTABLE DEVICE | Site: HIP | Status: FUNCTIONAL

## 2017-06-12 DEVICE — STEM FEM SZ 11 L125MM NK L38.5MM 135DEG 40MM OFFSET STD HIP: Type: IMPLANTABLE DEVICE | Site: HIP | Status: FUNCTIONAL

## 2017-06-12 DEVICE — ELIMINATOR H APEX FOR 48-60MM PINN HIP SHELL: Type: IMPLANTABLE DEVICE | Site: HIP | Status: FUNCTIONAL

## 2017-06-12 DEVICE — CUP ACET DIA52MM HIP GRIPTION PRI CEMENTLESS FIX 100 SER: Type: IMPLANTABLE DEVICE | Site: HIP | Status: FUNCTIONAL

## 2017-06-12 RX ORDER — LEVOTHYROXINE SODIUM 100 UG/1
100 TABLET ORAL
Status: DISCONTINUED | OUTPATIENT
Start: 2017-06-13 | End: 2017-06-16 | Stop reason: HOSPADM

## 2017-06-12 RX ORDER — PANTOPRAZOLE SODIUM 40 MG/1
40 TABLET, DELAYED RELEASE ORAL
Status: DISCONTINUED | OUTPATIENT
Start: 2017-06-13 | End: 2017-06-16 | Stop reason: HOSPADM

## 2017-06-12 RX ORDER — SODIUM CHLORIDE 0.9 % (FLUSH) 0.9 %
5-10 SYRINGE (ML) INJECTION AS NEEDED
Status: DISCONTINUED | OUTPATIENT
Start: 2017-06-12 | End: 2017-06-12 | Stop reason: HOSPADM

## 2017-06-12 RX ORDER — OXYCODONE AND ACETAMINOPHEN 5; 325 MG/1; MG/1
1 TABLET ORAL AS NEEDED
Status: DISCONTINUED | OUTPATIENT
Start: 2017-06-12 | End: 2017-06-12 | Stop reason: HOSPADM

## 2017-06-12 RX ORDER — BUPIVACAINE HYDROCHLORIDE 7.5 MG/ML
INJECTION, SOLUTION INTRASPINAL AS NEEDED
Status: DISCONTINUED | OUTPATIENT
Start: 2017-06-12 | End: 2017-06-12 | Stop reason: HOSPADM

## 2017-06-12 RX ORDER — CEFAZOLIN SODIUM IN 0.9 % NACL 2 G/50 ML
2 INTRAVENOUS SOLUTION, PIGGYBACK (ML) INTRAVENOUS EVERY 8 HOURS
Status: COMPLETED | OUTPATIENT
Start: 2017-06-12 | End: 2017-06-13

## 2017-06-12 RX ORDER — FAMOTIDINE 20 MG/1
20 TABLET, FILM COATED ORAL ONCE
Status: DISCONTINUED | OUTPATIENT
Start: 2017-06-12 | End: 2017-06-12 | Stop reason: HOSPADM

## 2017-06-12 RX ORDER — ACETAMINOPHEN 500 MG
1000 TABLET ORAL EVERY 6 HOURS
Status: DISCONTINUED | OUTPATIENT
Start: 2017-06-13 | End: 2017-06-16 | Stop reason: HOSPADM

## 2017-06-12 RX ORDER — CEFAZOLIN SODIUM IN 0.9 % NACL 2 G/50 ML
2 INTRAVENOUS SOLUTION, PIGGYBACK (ML) INTRAVENOUS ONCE
Status: COMPLETED | OUTPATIENT
Start: 2017-06-12 | End: 2017-06-12

## 2017-06-12 RX ORDER — HYDROMORPHONE HYDROCHLORIDE 2 MG/1
2 TABLET ORAL
Status: DISCONTINUED | OUTPATIENT
Start: 2017-06-12 | End: 2017-06-14

## 2017-06-12 RX ORDER — HYDROMORPHONE HYDROCHLORIDE 2 MG/ML
0.5 INJECTION, SOLUTION INTRAMUSCULAR; INTRAVENOUS; SUBCUTANEOUS
Status: DISCONTINUED | OUTPATIENT
Start: 2017-06-12 | End: 2017-06-12 | Stop reason: HOSPADM

## 2017-06-12 RX ORDER — FENTANYL CITRATE 50 UG/ML
INJECTION, SOLUTION INTRAMUSCULAR; INTRAVENOUS AS NEEDED
Status: DISCONTINUED | OUTPATIENT
Start: 2017-06-12 | End: 2017-06-12 | Stop reason: HOSPADM

## 2017-06-12 RX ORDER — ASPIRIN 325 MG
325 TABLET, DELAYED RELEASE (ENTERIC COATED) ORAL EVERY 12 HOURS
Qty: 70 TAB | Refills: 0 | Status: SHIPPED | OUTPATIENT
Start: 2017-06-12 | End: 2017-07-17

## 2017-06-12 RX ORDER — MORPHINE SULFATE 10 MG/ML
INJECTION, SOLUTION INTRAMUSCULAR; INTRAVENOUS AS NEEDED
Status: DISCONTINUED | OUTPATIENT
Start: 2017-06-12 | End: 2017-06-12 | Stop reason: HOSPADM

## 2017-06-12 RX ORDER — ASPIRIN 325 MG
325 TABLET, DELAYED RELEASE (ENTERIC COATED) ORAL EVERY 12 HOURS
Status: DISCONTINUED | OUTPATIENT
Start: 2017-06-12 | End: 2017-06-16 | Stop reason: HOSPADM

## 2017-06-12 RX ORDER — SODIUM CHLORIDE 9 MG/ML
100 INJECTION, SOLUTION INTRAVENOUS CONTINUOUS
Status: DISPENSED | OUTPATIENT
Start: 2017-06-12 | End: 2017-06-13

## 2017-06-12 RX ORDER — ONDANSETRON 2 MG/ML
4 INJECTION INTRAMUSCULAR; INTRAVENOUS
Status: DISCONTINUED | OUTPATIENT
Start: 2017-06-12 | End: 2017-06-16 | Stop reason: HOSPADM

## 2017-06-12 RX ORDER — PROPOFOL 10 MG/ML
VIAL (ML) INTRAVENOUS
Status: DISCONTINUED | OUTPATIENT
Start: 2017-06-12 | End: 2017-06-12 | Stop reason: HOSPADM

## 2017-06-12 RX ORDER — MIDAZOLAM HYDROCHLORIDE 1 MG/ML
2 INJECTION, SOLUTION INTRAMUSCULAR; INTRAVENOUS
Status: DISCONTINUED | OUTPATIENT
Start: 2017-06-12 | End: 2017-06-12 | Stop reason: HOSPADM

## 2017-06-12 RX ORDER — SODIUM CHLORIDE, SODIUM LACTATE, POTASSIUM CHLORIDE, CALCIUM CHLORIDE 600; 310; 30; 20 MG/100ML; MG/100ML; MG/100ML; MG/100ML
100 INJECTION, SOLUTION INTRAVENOUS CONTINUOUS
Status: DISCONTINUED | OUTPATIENT
Start: 2017-06-12 | End: 2017-06-12 | Stop reason: HOSPADM

## 2017-06-12 RX ORDER — BISOPROLOL FUMARATE 10 MG/1
10 TABLET ORAL DAILY
Status: DISCONTINUED | OUTPATIENT
Start: 2017-06-12 | End: 2017-06-16 | Stop reason: HOSPADM

## 2017-06-12 RX ORDER — ACETAMINOPHEN 10 MG/ML
1000 INJECTION, SOLUTION INTRAVENOUS ONCE
Status: COMPLETED | OUTPATIENT
Start: 2017-06-12 | End: 2017-06-12

## 2017-06-12 RX ORDER — SODIUM CHLORIDE 9 MG/ML
50 INJECTION, SOLUTION INTRAVENOUS CONTINUOUS
Status: DISCONTINUED | OUTPATIENT
Start: 2017-06-12 | End: 2017-06-12 | Stop reason: HOSPADM

## 2017-06-12 RX ORDER — HYDROMORPHONE HYDROCHLORIDE 2 MG/1
2 TABLET ORAL
Qty: 40 TAB | Refills: 0 | Status: SHIPPED | OUTPATIENT
Start: 2017-06-12

## 2017-06-12 RX ORDER — DIPHENHYDRAMINE HCL 25 MG
25 CAPSULE ORAL
Status: DISCONTINUED | OUTPATIENT
Start: 2017-06-12 | End: 2017-06-16 | Stop reason: HOSPADM

## 2017-06-12 RX ORDER — OXYCODONE HYDROCHLORIDE 5 MG/1
5 TABLET ORAL
Status: DISCONTINUED | OUTPATIENT
Start: 2017-06-12 | End: 2017-06-12 | Stop reason: HOSPADM

## 2017-06-12 RX ORDER — NALOXONE HYDROCHLORIDE 0.4 MG/ML
.2-.4 INJECTION, SOLUTION INTRAMUSCULAR; INTRAVENOUS; SUBCUTANEOUS
Status: DISCONTINUED | OUTPATIENT
Start: 2017-06-12 | End: 2017-06-16 | Stop reason: HOSPADM

## 2017-06-12 RX ORDER — DIPHENHYDRAMINE HYDROCHLORIDE 50 MG/ML
12.5 INJECTION, SOLUTION INTRAMUSCULAR; INTRAVENOUS ONCE
Status: DISCONTINUED | OUTPATIENT
Start: 2017-06-12 | End: 2017-06-12 | Stop reason: HOSPADM

## 2017-06-12 RX ORDER — FENTANYL CITRATE 50 UG/ML
25 INJECTION, SOLUTION INTRAMUSCULAR; INTRAVENOUS ONCE
Status: DISCONTINUED | OUTPATIENT
Start: 2017-06-12 | End: 2017-06-12 | Stop reason: HOSPADM

## 2017-06-12 RX ORDER — ROPIVACAINE HYDROCHLORIDE 2 MG/ML
INJECTION, SOLUTION EPIDURAL; INFILTRATION; PERINEURAL AS NEEDED
Status: DISCONTINUED | OUTPATIENT
Start: 2017-06-12 | End: 2017-06-12 | Stop reason: HOSPADM

## 2017-06-12 RX ORDER — LIDOCAINE HYDROCHLORIDE 10 MG/ML
0.1 INJECTION INFILTRATION; PERINEURAL AS NEEDED
Status: DISCONTINUED | OUTPATIENT
Start: 2017-06-12 | End: 2017-06-12 | Stop reason: HOSPADM

## 2017-06-12 RX ORDER — SODIUM CHLORIDE 0.9 % (FLUSH) 0.9 %
5-10 SYRINGE (ML) INJECTION EVERY 8 HOURS
Status: DISCONTINUED | OUTPATIENT
Start: 2017-06-12 | End: 2017-06-12 | Stop reason: HOSPADM

## 2017-06-12 RX ORDER — SODIUM CHLORIDE 0.9 % (FLUSH) 0.9 %
5-10 SYRINGE (ML) INJECTION AS NEEDED
Status: DISCONTINUED | OUTPATIENT
Start: 2017-06-12 | End: 2017-06-16 | Stop reason: HOSPADM

## 2017-06-12 RX ORDER — MIDAZOLAM HYDROCHLORIDE 1 MG/ML
2 INJECTION, SOLUTION INTRAMUSCULAR; INTRAVENOUS ONCE
Status: DISCONTINUED | OUTPATIENT
Start: 2017-06-12 | End: 2017-06-12 | Stop reason: HOSPADM

## 2017-06-12 RX ORDER — AMLODIPINE AND BENAZEPRIL HYDROCHLORIDE 10; 20 MG/1; MG/1
1 CAPSULE ORAL DAILY
Status: DISCONTINUED | OUTPATIENT
Start: 2017-06-12 | End: 2017-06-12 | Stop reason: RX

## 2017-06-12 RX ORDER — DEXAMETHASONE SODIUM PHOSPHATE 100 MG/10ML
10 INJECTION INTRAMUSCULAR; INTRAVENOUS ONCE
Status: DISPENSED | OUTPATIENT
Start: 2017-06-13 | End: 2017-06-14

## 2017-06-12 RX ORDER — AMOXICILLIN 250 MG
2 CAPSULE ORAL DAILY
Status: DISCONTINUED | OUTPATIENT
Start: 2017-06-13 | End: 2017-06-16 | Stop reason: HOSPADM

## 2017-06-12 RX ORDER — HYDROMORPHONE HYDROCHLORIDE 1 MG/ML
1 INJECTION, SOLUTION INTRAMUSCULAR; INTRAVENOUS; SUBCUTANEOUS
Status: DISCONTINUED | OUTPATIENT
Start: 2017-06-12 | End: 2017-06-14

## 2017-06-12 RX ORDER — NEOMYCIN AND POLYMYXIN B SULFATES 40; 200000 MG/ML; [USP'U]/ML
SOLUTION IRRIGATION AS NEEDED
Status: DISCONTINUED | OUTPATIENT
Start: 2017-06-12 | End: 2017-06-12 | Stop reason: HOSPADM

## 2017-06-12 RX ORDER — ALENDRONATE SODIUM 10 MG/1
70 TABLET ORAL
Status: DISCONTINUED | OUTPATIENT
Start: 2017-06-17 | End: 2017-06-12

## 2017-06-12 RX ORDER — ONDANSETRON 2 MG/ML
INJECTION INTRAMUSCULAR; INTRAVENOUS AS NEEDED
Status: DISCONTINUED | OUTPATIENT
Start: 2017-06-12 | End: 2017-06-12 | Stop reason: HOSPADM

## 2017-06-12 RX ORDER — SODIUM CHLORIDE 0.9 % (FLUSH) 0.9 %
5-10 SYRINGE (ML) INJECTION EVERY 8 HOURS
Status: DISCONTINUED | OUTPATIENT
Start: 2017-06-12 | End: 2017-06-16 | Stop reason: HOSPADM

## 2017-06-12 RX ADMIN — ONDANSETRON 4 MG: 2 INJECTION INTRAMUSCULAR; INTRAVENOUS at 08:36

## 2017-06-12 RX ADMIN — CEFAZOLIN 2 G: 1 INJECTION, POWDER, FOR SOLUTION INTRAMUSCULAR; INTRAVENOUS; PARENTERAL at 07:41

## 2017-06-12 RX ADMIN — LIDOCAINE HYDROCHLORIDE 0.1 ML: 10 INJECTION, SOLUTION INFILTRATION; PERINEURAL at 06:22

## 2017-06-12 RX ADMIN — Medication 10 MCG/KG/MIN: at 07:59

## 2017-06-12 RX ADMIN — SODIUM CHLORIDE, SODIUM LACTATE, POTASSIUM CHLORIDE, AND CALCIUM CHLORIDE 1000 ML: 600; 310; 30; 20 INJECTION, SOLUTION INTRAVENOUS at 06:21

## 2017-06-12 RX ADMIN — ACETAMINOPHEN 1000 MG: 10 INJECTION, SOLUTION INTRAVENOUS at 18:18

## 2017-06-12 RX ADMIN — SODIUM CHLORIDE, SODIUM LACTATE, POTASSIUM CHLORIDE, AND CALCIUM CHLORIDE: 600; 310; 30; 20 INJECTION, SOLUTION INTRAVENOUS at 08:23

## 2017-06-12 RX ADMIN — CEFAZOLIN 2 G: 1 INJECTION, POWDER, FOR SOLUTION INTRAMUSCULAR; INTRAVENOUS; PARENTERAL at 18:19

## 2017-06-12 RX ADMIN — BUPIVACAINE HYDROCHLORIDE 1.8 ML: 7.5 INJECTION, SOLUTION INTRASPINAL at 07:52

## 2017-06-12 RX ADMIN — SODIUM CHLORIDE 100 ML/HR: 900 INJECTION, SOLUTION INTRAVENOUS at 18:19

## 2017-06-12 RX ADMIN — SODIUM CHLORIDE, SODIUM LACTATE, POTASSIUM CHLORIDE, AND CALCIUM CHLORIDE: 600; 310; 30; 20 INJECTION, SOLUTION INTRAVENOUS at 07:30

## 2017-06-12 RX ADMIN — Medication 5 ML: at 18:19

## 2017-06-12 RX ADMIN — FENTANYL CITRATE 25 MCG: 50 INJECTION, SOLUTION INTRAMUSCULAR; INTRAVENOUS at 08:08

## 2017-06-12 RX ADMIN — FENTANYL CITRATE 25 MCG: 50 INJECTION, SOLUTION INTRAMUSCULAR; INTRAVENOUS at 07:47

## 2017-06-12 NOTE — PROGRESS NOTES
Problem: Mobility Impaired (Adult and Pediatric)  Goal: *Acute Goals and Plan of Care (Insert Text)  GOALS (1-4 days):  (1.)Ms. Danny De Luna will move from supine to sit and sit to supine in bed with CONTACT GUARD ASSIST.   (2.)Ms. Danny De Luna will transfer from bed to chair and chair to bed with CONTACT GUARD ASSIST using the least restrictive device. (3.)Ms. Danny De Luna will ambulate with CONTACT GUARD ASSIST for 150 feet with the least restrictive device. (4.)Ms. Skaggs will ambulate up/down 4 steps with bilateral railing with MINIMAL ASSIST with no device. (5.)Ms. Skaggs will state/observe EDA precautions with 0 verbal cues. ________________________________________________________________________________________________      PHYSICAL THERAPY JOINT CAMP EDA: INITIAL ASSESSMENT, AM 6/12/2017  INPATIENT: Hospital Day: 1  [place] / [place] / Product Type: Medicare /      NAME/AGE/GENDER: Linette Stuart is a 80 y.o. female       PRIMARY DIAGNOSIS:  Primary osteoarthritis of right hip [M16.11]              Procedure(s) and Anesthesia Type:     * RIGHT HIP ARTHROPLASTY TOTAL / Reeder Ode - Spinal (Right)  ICD-10: Treatment Diagnosis:        · Pain in Right Hip (M25.551)  · Stiffness of Right Hip, Not elsewhere classified (M25.651)  · Difficulty in walking, Not elsewhere classified (R26.2)  · Other abnormalities of gait and mobility (R26.89)       ASSESSMENT:      Ms. Danny De Luna presents S/P R EDA. She is a bit lethargic. She presents with decreased R LE strength and ROm,s tandign balance, functional mobility and EDA awareness. She does have some cognitive issues Her spouse is a little tearful and appears overwhelmed. This section established at most recent assessment   PROBLEM LIST (Impairments causing functional limitations):  1. Decreased Strength  2. Decreased Transfer Abilities  3. Decreased Ambulation Ability/Technique  4. Decreased Balance  5. Increased Pain  6.  Decreased Activity Tolerance  7. Increased Fatigue  8. Decreased Flexibility/Joint Mobility  9. Decreased Knowledge of Precautions  10. Decreased [place] with Home Exercise Program    INTERVENTIONS PLANNED: (Benefits and precautions of physical therapy have been discussed with the patient.)  1. Balance Exercise  2. Bed Mobility  3. Cold  4. Gait Training  5. Home Exercise Program (HEP)  6. Therapeutic Exercise/Strengthening  7. Transfer Training  8. EDA education  9. Range of Motion: active/assisted/passive  10. Therapeutic Activities  11. Group Therapy      TREATMENT PLAN: Frequency/Duration: Follow patient BID   to address above goals. Rehabilitation Potential For Stated Goals: Good      RECOMMENDED REHABILITATION/EQUIPMENT: (at time of discharge pending progress): Continue Skilled Therapy and Home Health: Physical Therapy. HISTORY:   History of Present Injury/Illness (Reason for Referral):  S/P R EDA  Past Medical History/Comorbidities:   Ms. Sheri Akhtar  has a past medical history of Arthritis; Cancer [facility]); CKD (chronic kidney disease); Dyslipidemia; GERD (gastroesophageal reflux disease); UTI (urinary tract infection); Hypertension; LBBB (left bundle branch block); Pacemaker (2009); SSS (sick sinus syndrome) ([address] Status post right hip replacement (6/12/2017); and Thyroid disease. She also has no past medical history of Adverse effect of anesthesia; Aneurysm ([address] Asthma; Autoimmune disease ([address] CAD (coronary artery disease); Chronic obstructive pulmonary disease ([address] Coagulation disorder ([address] Diabetes ([address] Difficult intubation; Endocarditis; Heart failure ([address] Liver disease; Malignant hyperthermia due to anesthesia; Nausea & vomiting; Nicotine vapor product user; Non-nicotine vapor product user; Pseudocholinesterase deficiency; Psychiatric disorder; PUD (peptic ulcer disease); Rheumatic fever; Seizures ([address] Sleep apnea; Stroke [facility]); or Thromboembolus ([address]   Ms. Sheri Akhtar  has a past surgical history that includes pacemaker; hysterectomy (01/1974); hernia repair (05/24/2011); cataract removal (Bilateral); breast surgery procedure unlisted; bunionectomy (07/28/2006); knee replacement (Bilateral); thyroidectomy (2009); knee arthroscopy (Bilateral); carpal tunnel release (Left, 2003); heart catheterization (09/2001); and pacemaker placement (07/15/2009). Social History/Living Environment:   Home Environment: Private residence  # Steps to Enter: 2  One/Two Story Residence: Two story  # of Interior Steps: 12  Interior Rails: Left  Living Alone: No  Support Systems: Spouse/Significant Other/Partner  Patient Expects to be Discharged to[de-identified] Private residence  Current DME Used/Available at Home: [employer] or Shower Type: Tub  Prior Level of Function/Work/Activity:  Functionally independent with ADLs and amb   Number of Personal Factors/Comorbidities that affect the Plan of Care: 0: LOW COMPLEXITY   EXAMINATION:   Most Recent Physical Functioning:   Gross Assessment: Yes  Gross Assessment  AROM: Within functional limits (L LE)  Strength: Generally decreased, functional (L LE 3+/5)  Coordination: Generally decreased, functional (BUE)  Tone: Normal  Sensation: Intact (L LE)         RLE AROM  R Hip Flexion: 70  R Hip ABduction: 15        RLE Strength  R Hip Flexion: 2  R Hip ABduction: 2  R Knee Extension: 2+  R Ankle Dorsiflexion: 2+     Bed Mobility  Rolling: Minimum assistance  Supine to Sit: Moderate assistance  Sit to Supine: Moderate assistance     Transfers  Sit to Stand: Moderate assistance;Assist x2  Stand to Sit: Moderate assistance;Assist x2  Bed to Chair: Maximum assistance;Assist x2     Balance  Sitting: High guard  Standing: Pull to stand; With support                Weight Bearing Status  Right Side Weight Bearing: As tolerated  Distance (ft): 2 Feet (ft)  Ambulation - Level of Assistance:  Moderate assistance;Assist x2  Assistive Device: Walker, rolling  Base of Support: Narrowed  Speed/Janet: Slow  Step Length: Left shortened  Stance: Right decreased  Gait Abnormalities: Antalgic; Step to gait  Interventions: Safety awareness training; Tactile cues; Verbal cues      Braces/Orthotics:              Body Structures Involved:  1. Bones  2. Joints  3. Muscles Body Functions Affected:  1. Neuromusculoskeletal  2. Movement Related Activities and Participation Affected:  1. Mobility  2. Self Care   Number of elements that affect the Plan of Care: 4+: HIGH COMPLEXITY   CLINICAL PRESENTATION:   Presentation: Stable and uncomplicated: LOW COMPLEXITY   CLINICAL DECISION MAKIN[address] [place] [address] AM-PAC 6 Clicks   Basic Mobility Inpatient Short Form  How much difficulty does the patient currently have. .. Unable A Lot A Little None   1. Turning over in bed (including adjusting bedclothes, sheets and blankets)? [ ] 1   [ ] 2   [x ] 3   [ ] 4   2. Sitting down on and standing up from a chair with arms ( e.g., wheelchair, bedside commode, etc.)   [ ] 1   [x ] 2   [ ] 3   [ ] 4   3. Moving from lying on back to sitting on the side of the bed? [ ] 1   [x ] 2   [ ] 3   [ ] 4   How much help from another person does the patient currently need. .. Total A Lot A Little None   4. Moving to and from a bed to a chair (including a wheelchair)? [ ] 1   [x ] 2   [ ] 3   [ ] 4   5. Need to walk in hospital room? [x ] 1   [ ] 2   [ ] 3   [ ] 4   6. Climbing 3-5 steps with a railing? [x ] 1   [ ] 2   [ ] 3   [ ] 4   © , Trustees of [address] [place] [address], under license to [employer]. All rights reserved       Score:  [id number] Most Recent: X (Date: -- )     Interpretation of Tool:  Represents activities that are increasingly more difficult (i.e. Bed mobility, Transfers, Gait). Score 24 23 22-20 19-15 14-10 9-7 6       Modifier CH CI CJ CK CL CM CN        ?  Mobility - Walking and Moving Around:     - CURRENT STATUS: CL - 60%-79% impaired, limited or restricted    - GOAL STATUS: CK - 40%-59% impaired, limited or restricted    - D/C STATUS:  ---------------To be determined---------------  [place] / Product Type: Medicare /       Medical Necessity:     · Patient demonstrates fair rehab potential due to higher previous functional level. Reason for Services/Other Comments:  · Patient continues to require present interventions due to patient's inability to perform functional mobility independently. Use of outcome tool(s) and clinical judgement create a POC that gives a: Clear prediction of patient's progress: LOW COMPLEXITY                 TREATMENT:   (In addition to Assessment/Re-Assessment sessions the following treatments were rendered)      Pre-treatment Symptoms/Complaints:  Feels dizzy  Pain: Initial:   Pain Intensity 1: 0  Post Session:  0      Assessment/Reassessment only, no treatment provided today        Date:   Date:    Date:      ACTIVITY/EXERCISE AM PM AM PM AM PM   GROUP THERAPY  [ ]  [ ]  [ ]  [ ]  [ ]  [ ]   Ankle Pumps               Quad Sets               Gluteal Sets               Hip ABd/ADduction               Straight Leg Raises               Knee Slides               Short Arc Quads               Long Arc Quads               Chair Slides                               B = bilateral; AA = active assistive; A = active; P = passive       Treatment/Session Assessment:         Response to Treatment:  Became dizzy as we got up. Returned to bed and she felt better. RN informed of situation. .     Education:  [ ] Home Exercises  [x ] Fall Precautions  [x ] Hip Precautions [ ] Going Home Video  [ ] Knee/Hip Prosthesis Review  [x ] Walker Management/Safety [ ] Adaptive Equipment as Needed         Interdisciplinary Collaboration:   · Physical Therapist  · Occupational Therapist  · Registered Nurse     After treatment position/precautions:   · Supine in bed  · Bed/Chair-wheels locked  · Bed in low position  · Call light within reach  · RN notified  · Family at bedside  · Side rails x 3     Compliance with Program/Exercises: Will assess as treatment progresses. Recommendations/Intent for next treatment session:  Treatment next visit will focus on increasing Ms. Skaggs's independence with bed mobility, transfers, gait training, strength/ROM exercises, modalities for pain, and patient education.        Total Treatment Duration:  PT Patient Time In/Time Out  Time In: 1105  Time Out: 210 Britni Cherry, PT

## 2017-06-12 NOTE — CONSULTS
MD Boyd   Medical Director  [address] [place] [address], [facility]  Tel: [phone]     Physical Medicine & Rehabilitation Note-consult    Patient: Issac Fabry MRN: [id number]  SSN: [id number]    YOB: 1930  Age: 80 y.o. Sex: female      Admit Date: 6/12/2017  Admitting Physician: Anne Ojeda MD    Medical Decision Making/Plan/Recommend: Gait impairment/ functional deficits s/p right EDA. Patient tolerating PT session well. No major setbacks. Patient expected to make steady gains to allow safe community d/c. Continued rehab at home via [facility] PT would be reasonable and necessary. Continue PT, OT for right hip ROM, strengthening, mobility, transfers, and gait training. Will follow progress. Chief Complaint : Gait dysfunction secondary to below. Admit Diagnosis: Primary osteoarthritis of right hip [M16.11]  right total hip arthroplasty   6/12/2017  Pain  DVT risk  Post op hemorrhagic anemia  CKD (chronic kidney disease)  Arthritis  Acute Rehab Dx:  Gait impairment  Mobility and ambulation deficits  Self Care/ADL deficits    Medical Dx:  Past Medical History:   Diagnosis Date    Arthritis     Cancer ([address]     breast (right)    CKD (chronic kidney disease)     Dyslipidemia     GERD (gastroesophageal reflux disease)     Hx: UTI (urinary tract infection)     Hypertension     LBBB (left bundle branch block)     Pacemaker 2009    [facility]    SSS (sick [facility] syndrome) [facility]     Status post right hip replacement 6/12/2017    Thyroid disease      Subjective:     Date of Evaluation:  June 12, 2017    HPI: Issac Fabry is a 80 y.o. female patient at [address] [place] [address] who was admitted on 6/12/2017  by Anne Ojeda MD with below mentioned medical history, is being seen for Physical Medicine and Rehabilitation consult. Issac Fabry had painful right hip due to severe DJD.  The symptoms were refractory to conservative treatment . She underwent a right total hip arthroplasty per Dr. Yesenia Reed MD on 6/12/2017. The patient's post operative course has been uncomplicated. We are consulted to assist with rehab needs and placement. Therapy tolerated well so far. Patient's is to be WBAT RLE. Hip precautions to be followed strictly for RLE. Patient shows no major barriers. She is still limited by   right hip pain, decreased ROM and strength. Aisha Lopez is seen and examined today. Medical Records reviewed. She denies any other deficits. She is independent with ambulation at her baseline. Prior Level of Function/Work/Activity:  Functionally independent with ADLs and amb    Current Level of Function:  bed mobility - mod A, transfers - modA, decreased balance, ambulation - 2' with mod A using a RW       Family History   Problem Relation Age of Onset    Heart Disease Mother       Social History   Substance Use Topics    Smoking status: Never Smoker    Smokeless tobacco: Not on file    Alcohol use No     Past Surgical History:   Procedure Laterality Date    BREAST SURGERY PROCEDURE UNLISTED      x 3 from [id number] for breast cancer    HX BUNIONECTOMY  07/28/2006    HX CARPAL TUNNEL RELEASE Left 2003    HX CATARACT REMOVAL Bilateral     1/7/2003, 2/25/2003    HX HEART CATHETERIZATION  09/2001    HX HERNIA REPAIR  05/24/2011    HX HYSTERECTOMY  01/1974    HX KNEE ARTHROSCOPY Bilateral     HX KNEE REPLACEMENT Bilateral     right (2/2005) and left (2/2006)    HX PACEMAKER      HX PACEMAKER PLACEMENT  07/15/2009    HX THYROIDECTOMY  2009      Prior to Admission medications    Medication Sig Start Date End Date Taking? Authorizing Provider   aspirin delayed-release 325 mg tablet Take 1 Tab by mouth every twelve (12) hours every twelve (12) hours for 35 days. 6/12/17 7/17/17 Yes KAYLA Weebr   HYDROmorphone (DILAUDID) 2 mg tablet Take 1 Tab by mouth every four (4) hours as needed.  Max Daily Amount: 12 mg. 6/12/17  Yes KAYLA Meredith   levothyroxine (SYNTHROID) 100 mcg tablet Take 100 mcg by mouth Daily (before breakfast). Take / use AM day of surgery  per anesthesia protocols. Yes Historical Provider   traMADol (ULTRAM) 50 mg tablet Take 50 mg by mouth every six (6) hours as needed for Pain. Take / use AM day of surgery  per anesthesia protocols if needed. Yes Historical Provider   omeprazole (PRILOSEC) 20 mg capsule Take 20 mg by mouth daily. Take / use AM day of surgery  per anesthesia protocols. Yes Historical Provider   bisoprolol (ZEBETA) 10 mg tablet Take 10 mg by mouth daily. Take / use AM day of surgery  per anesthesia protocols. Yes Historical Provider   aspirin 81 mg tablet Take 81 mg by mouth daily. Take / use AM day of surgery  per anesthesia protocols. 12/13/10  Yes Historical Provider   calcium-vitamin D (OYSTER SHELL) 500 mg(1,250mg) -200 unit per tablet Take 1 Tab by mouth daily. Historical Provider   ACETAMINOPHEN (TYLENOL PO) Take  by mouth as needed. Take / use AM day of surgery  per anesthesia protocols if needed. Historical Provider   amLODIPine-benazepril (LOTREL) 10-20 mg per capsule Take 1 capsule by mouth daily. Historical Provider   pravastatin (PRAVACHOL) 10 mg tablet Take 80 mg by mouth nightly. Historical Provider   alendronate (FOSAMAX) 70 mg tablet Take 70 mg by mouth Every Saturday. 12/18/10   Historical Provider   ezetimibe (ZETIA) 10 mg tablet Take 10 mg by mouth every evening. 12/13/10   Historical Provider     Allergies   Allergen Reactions    Other Medication Rash     Arthritis medication    Tiazac [Diltiazem Hcl] Rash        Review of Systems: +right hip pain. Denies chest pain, shortness of breath, cough, headache, visual problems, abdominal pain, dysurea, calf pain. Pertinent positives are as noted in the medical records and unremarkable otherwise. Objective:     Vitals:  Blood pressure 119/59, pulse 67, temperature 95.3 °F (35.2 °C), resp. rate 16, height 5' 5\" (1.651 m), weight 126 lb (57.2 kg), SpO2 93 %. Temp (24hrs), Av.8 °F (36 °C), Min:95.3 °F (35.2 °C), Max:98.3 °F (36.8 °C)    BMI (calculated): 21 (17 0835)   Intake and Output:   0701 -  1900  In: 1700 [I.V.:1700]  Out: 900 [Urine:700]    Physical Exam:   General: Alert and age appropriately oriented. No acute cardio respiratory distress. HEENT: Normocephalic, no conjunctival pallor, no scleral icterus  Oral mucosa moist without cyanosis   Lungs: Clear to auscultation bilaterally. Respiration even and unlabored   Heart: Regular rate and rhythm, S1, S2   No  murmurs, clicks, rub or gallops   Abdomen: Soft, non-tender, non-distended. Genitourinary: defered   Neuromuscular:      Grossly no focal motor deficits. Right knee extension strong. Right ankle dorsiflexion 5/5  Right ankle plantarflexion 5/5  No sensory deficits distally BLE to soft touch. Skin/extremity: Calves non tender BLE. No rashes, no marginal erythema. Labs/Studies:  Recent Results (from the past 72 hour(s))   TYPE & SCREEN    Collection Time: 17  5:50 AM   Result Value Ref Range    Crossmatch Expiration 06/15/2017     ABO/Rh(D) A POSITIVE     Antibody screen NEG    GLUCOSE, POC    Collection Time: 17  6:30 AM   Result Value Ref Range    Glucose (POC) 95 65 - 100 mg/dL       Functional Assessment:  Reviewed participation and progress in therapies  Gross Assessment  AROM: Within functional limits (L LE) (17 1136)  Strength: Generally decreased, functional (L LE 3+/5) (17 1136)  Coordination: Generally decreased, functional (BUE) (17 1134)  Tone: Normal (17 1134)  Sensation: Intact (L LE) (17 1136)   Bed Mobility  Rolling: Minimum assistance (17 1136)  Supine to Sit: Moderate assistance (17 1136)  Sit to Supine:  Moderate assistance (17 9704) Balance  Sitting: High guard (06/12/17 1136)  Standing: Pull to stand; With support (06/12/17 1136)               Bed/Mat Mobility  Rolling: Minimum assistance (06/12/17 1136)  Supine to Sit: Moderate assistance (06/12/17 1136)  Sit to Supine: Moderate assistance (06/12/17 1136)  Sit to Stand: Moderate assistance;Assist x2 (06/12/17 1136)  Bed to Chair: Maximum assistance;Assist x2 (06/12/17 1134)     Ambulation:  Gait  Base of Support: Narrowed (06/12/17 1136)  Speed/Janet: Slow (06/12/17 1136)  Step Length: Left shortened (06/12/17 1136)  Stance: Right decreased (06/12/17 1136)  Gait Abnormalities: Antalgic; Step to gait (06/12/17 1136)  Ambulation - Level of Assistance: Moderate assistance;Assist x2 (06/12/17 1136)  Distance (ft): 2 Feet (ft) (06/12/17 1136)  Assistive Device: Walker, rolling (06/12/17 1136)  Interventions: Safety awareness training; Tactile cues; Verbal cues (06/12/17 1136)    Impression/Plan:     Principal Problem:    Status post right hip replacement (6/12/2017)        Current Facility-Administered Medications   Medication Dose Route Frequency Provider Last Rate Last Dose    bisoprolol (ZEBETA) tablet 10 mg  10 mg Oral DAILY Frutoso Escamilla, [place]        [START ON 6/13/2017] levothyroxine (SYNTHROID) tablet 100 mcg  100 mcg Oral ACB Frutoso Escamilla, [place]        [START ON 6/13/2017] pantoprazole (PROTONIX) tablet 40 mg  40 mg Oral ACB Frutoso Escamilla, [place]        0.9% sodium chloride infusion  100 mL/hr IntraVENous CONTINUOUS Frutoso Escamilla,  mL/hr at 06/12/17 1819 100 mL/hr at 06/12/17 1819    sodium chloride (NS) flush 5-10 mL  5-10 mL IntraVENous Q8H Frutoso Escamilla, PA   5 mL at 06/12/17 1819    sodium chloride (NS) flush 5-10 mL  5-10 mL IntraVENous PRN Frutoso Escamilla, PA        ceFAZolin in 0.9% NS (ANCEF) IVPB soln 2 g  2 g IntraVENous Q8H KAYLA Escalona 100 mL/hr at 06/12/17 1819 2 g at 06/12/17 1819    [START ON 6/13/2017] acetaminophen (TYLENOL) tablet 1,000 mg  1,000 mg Oral Q6H Renny Keller        HYDROmorphone (DILAUDID) tablet 2 mg  2 mg Oral Q4H PRN Tim Kellerma        HYDROmorphone (PF) (DILAUDID) injection 1 mg  1 mg IntraVENous Q3H PRN KAYLA Keller        naloxone [facility]) injection 0.2-0.4 mg  0.2-0.4 mg IntraVENous Q10MIN PRN KAYAL Keller        [START ON 6/13/2017] dexamethasone (DECADRON) injection 10 mg  10 mg IntraVENous ONCE Tim Kellerma        ondansetron [facility] injection 4 mg  4 mg IntraVENous Q4H PRN KAYLA Keller        diphenhydrAMINE (BENADRYL) capsule 25 mg  25 mg Oral Q4H PRN KAYLA Keller        [START ON 6/13/2017] senna-docusate (PERICOLACE) 8.6-50 mg per tablet 2 Tab  2 Tab Oral DAILY Renny Keller        aspirin delayed-release tablet 325 mg  325 mg Oral Q12H [facility]        [START ON 6/13/2017] tuberculin injection 5 Units  5 Units [facility] [place]        [START ON 6/13/2017] amLODIPine/benazepril (LOTREL) 10/20 mg   Oral DAILY Kayode Raygoza MD            Recommendations: Recommend  PT. Continue Acute Rehab Program. Continue gait training, transfer training, balance activities. Coordination of rehab/medical care. Will follow along with you for PM&R issues and provide you follow up. Rehabilitation Management/ Medical Management: 1. Devices:Walkers, Type: Rolling Walker  2. Consult:Rehab team including PT, OT,  and . 3. Disposition Rehab-discussed with patient. 4. Thigh-high or knee-high DEAN's when out of bed. 5. DVT Prophylaxis - aspirin 325mg bid x 30days. 6. Incentive spirometer Q1H while awake  7. Post op hemorrhagic anemia- monitor. Asymptomatic. 8. Activity: WBAT RLE, resume total hip precautions. 9. Planned Labs: CBC,BMP  10. Pain Control: fair control. Continue scheduled tylenol, celebrex and  PRN meds. Continue current management. 11. Wound Care: Keep surgical wound clean and dry and reinforce dressing PRN. May remove Aquacel 1 week post op ad replace with new one. Remove staples 12-14 post surgery, when incision appears appropriately closed and apply benzoin and 1/2\" steristrips. Follow up with Dr Eldon Loredo  2 weeks after discharge from rehab. Follow up with ORTHO per instructions. Thank you for the opportunity to participate in the care of this patient.     Signed By: Oscar Lott MD     June 12, 2017

## 2017-06-12 NOTE — PERIOP NOTES
TRANSFER - OUT REPORT:    Verbal report given Camila Howard RN(name) on Ottis Bosworth  being transferred to [facility](orthopedic unit) for routine progression of care       Report consisted of patients Situation, Background, Assessment and   Recommendations(SBAR). Information from the following report(s) SBAR, OR Summary, Procedure Summary, Intake/Output and MAR was reviewed with the receiving nurse. Opportunity for questions and clarification was provided.       Patient transported with:   O2 @ 2 liters

## 2017-06-12 NOTE — PROGRESS NOTES
Assessment completed. Pt alert and oriented X4. Moving BLE slightly. Pedal pulses are palpable. No signs of distress noted. Oriented pt to room, unit, dining on call, IS, and pain management. Pt on 1 liter oxygen via nasal cannula. Lungs clear to auscultation. Bowel sounds present. Dickinson to drainage with no loops in tubing, and stat lock in place. Yellow/straw color urine noted. IV intact with no redness, or swelling noted infusing. Incision to right hip covered with Aquacel dressing. Ice applied and plexi-pulses on. Pt denies pain. Call light within reach and bed in low position and locked. Pt informed to call out for needs verbalizes understanding.

## 2017-06-12 NOTE — H&P
The patient has end stage arthritis of the right hip. The patient was see and examined and there are no changes to the patient's orthopedic condition. They have tried conservative treatment for this condition; including antiinflammatories and lifestyle modifications and have failed. The necessity for the joint replacement is still present, and the H&P from the office is still current.  The patient will be admitted today for Procedure(s) (LRB):  RIGHT HIP ARTHROPLASTY TOTAL / Severino Wilson (Right)

## 2017-06-12 NOTE — DISCHARGE INSTRUCTIONS
[facility]   Patient Discharge Instructions    Kaushik Sampson / [id number] : 1930    Admitted 2017 Discharged: 2017     IF YOU HAVE ANY PROBLEMS ONCE YOU ARE AT HOME CALL THE FOLLOWING NUMBERS:   Main office number: [phone]      Medications    · The medications you are to continue on are listed on the medication reconciliation sheet. · Narcotic pain medications as well as supplemental iron can cause constipation. If this occurs try stopping the narcotic pain medication and/or the iron. · It is important that you take the medication exactly as they are prescribed. · Medications which increase your risk of blood clots are listed to stop for 5 weeks after surgery as well as medications or supplements which increase your risk of bleeding complications. · Keep your medication in the bottles provided by the pharmacist and keep a list of the medication names, dosages, and times to be taken in your wallet. · Do not take other medications without consulting your doctor. Important Information    Do NOT smoke as this will greatly increase your risk of infection! Resume your prehospital diet. If you have excessive nausea or vomitting call your doctor's office     Leg swelling and warmth is normal for 6 months after surgery. If you experience swelling in your leg elevate you leg while laying down with your toes above your heart. If you have sudden onset severe swelling with leg pain call our office. Use Kenney Hose stockings until we see you in the office for your follow up appointment. The stitches deep inside take approximately 6 months to dissolve. There will be sharp shooting, stinging and burning pain. This is normal and will resolve between 3-6 months after surgery. Difficulty sleeping is normal following total Knee and Hip replacement. You may try melatonin, an over-the-counter sleep aid or benadryl to help with sleep.  Most patients will resume sleeping through the night 8 weeks after surgery. Home Physical Therapy is arranged. Home Health will contact you within 48 hrs of discharge that you have chosen. If you have not received a call within this time frame please contact that provider you chose. You should be given this information before you leave the hospital.     You are at a risk for falls. Use the rolling walker when walking. Patients who have had a joint replacement should not drive if they are still taking narcotic pain mediation during the daytime hours. Most patients wean themselves off of pain medication within 2-5 weeks after surgery. When to Call the office    - If you have a temperature greater then 101  - Uncontrolled vomiting   - Loose control of your bladder or bowel function  - Are unable to bear any wieght   - Need a pain medication refill     Information obtained by :  I understand that if any problems occur once I am at home I am to contact my physician. I understand and acknowledge receipt of the instructions indicated above.                                                                                                                                            Physician's or R.N.'s Signature                                                                  Date/Time                                                                                                                                              Patient or Representative Signature                                                          Date/Time

## 2017-06-12 NOTE — PROGRESS NOTES
06/12/17 1420   Oxygen Therapy   O2 Sat (%) 94 %   Pulse via Oximetry 60 beats per minute   O2 Device Room air   O2 Flow Rate (L/min) 0 l/min   O2 Sat Re-check

## 2017-06-12 NOTE — PROGRESS NOTES
[facility]  Face to Face Encounter    Patients Name: Beverly Parker    YOB: 1930    Ordering Physician: Mily Dumont    Primary Diagnosis: Primary osteoarthritis of right hip [M16.11]  S/p right EDA    Date of Face to Face:   6/12/2017                                  Face to Face Encounter findings are related to primary reason for home care:   yes. 1. I certify that the patient needs intermittent care as follows: physical therapy: gait/stair training    2. I certify that this patient is homebound, that is: 1) patient requires the use of a walker device, special transportation, or assistance of another to leave the home; or 2) patient's condition makes leaving the home medically contraindicated; and 3) patient has a normal inability to leave the home and leaving the home requires considerable and taxing effort. Patient may leave the home for infrequent and short duration for medical reasons, and occasional absences for non-medical reasons. Homebound status is due to the following functional limitations: Patient's ambulation limited secondary to severe pain and requires the use of an assistive device and the assistance of a caregiver for safe completion. Patient with strength and ROM deficits limiting ambulation endurance requiring the use of an assistive device and the assistance of a caregiver. Patient deemed temporarily homebound secondary to increased risk for infection when leaving home and going out into the community. 3. I certify that this patient is under my care and that I, or a nurse practitioner or  [id number], or clinical nurse specialist, or certified nurse midwife, working with me, had a Face-to-Face Encounter that meets the physician Face-to-Face Encounter requirements.   The following are the clinical findings from the [address] [place] [address] encounter that support the need for skilled services and is a summary of the encounter: see hospital chart        Funmilayo Olmos RAND  6/12/2017      THE FOLLOWING TO BE COMPLETED BY THE COMMUNITY PHYSICIAN:    I concur with the findings described above from the F2F encounter that this patient is homebound and in need of a skilled service.     Certifying Physician: _____________________________________      Printed Certifying Physician Name: _____________________________________    Date: _________________

## 2017-06-12 NOTE — ANESTHESIA PREPROCEDURE EVALUATION
Anesthetic History   No history of anesthetic complications            Review of Systems / Medical History  Patient summary reviewed and pertinent labs reviewed    Pulmonary  Within defined limits                 Neuro/Psych   Within defined limits           Cardiovascular    Hypertension: well controlled        Dysrhythmias (SSS)   Hyperlipidemia    Exercise tolerance: <4 METS  Comments: LBBB   GI/Hepatic/Renal     GERD: well controlled    Renal disease: CRI       Endo/Other      Hypothyroidism: well controlled  Arthritis and cancer (breast)     Other Findings              Physical Exam    Airway  Mallampati: II  TM Distance: 4 - 6 cm  Neck ROM: normal range of motion   Mouth opening: Normal     Cardiovascular  Regular rate and rhythm,  S1 and S2 normal,  no murmur, click, rub, or gallop  Rhythm: regular  Rate: normal         Dental  No notable dental hx       Pulmonary  Breath sounds clear to auscultation               Abdominal  GI exam deferred       Other Findings            Anesthetic Plan    ASA: 3  Anesthesia type: spinal            Anesthetic plan and risks discussed with: Patient and Spouse

## 2017-06-12 NOTE — PERIOP NOTES
TRANSFER - IN REPORT:    Verbal report received from Negar ESCOBAR/Staci [address] RN on Elvin Narayanan  being received from [facility] for routine progression of care      Report consisted of patients Situation, Background, Assessment and   Recommendations(SBAR). Information from the following report(s) SBAR, OR Summary and MAR was reviewed with the receiving nurse. Opportunity for questions and clarification was provided. Assessment completed upon patients arrival to unit and care assumed.

## 2017-06-12 NOTE — PROGRESS NOTES
Care Management Interventions  Mode of Transport at Discharge: Self  Transition of Care Consult (CM Consult): [address]: No  Reason Outside [place]: Out of service area  Discharge Durable Medical Equipment: No  Physical Therapy Consult: Yes  Occupational Therapy Consult: Yes  Current Support Network: Lives with Spouse  Confirm Follow Up Transport: Family  Plan discussed with Pt/Family/Caregiver: Yes  Freedom of Choice Offered: Yes  Discharge Location  Discharge Placement: Home with home health  Patient is a 80y.o. year old female admitted for Right EDA . Patient lives with Her spouse in [place], [place] and plans to return home on discharge. Order received to arrange home health. Patient without preference towards agency. Referral sent to [facility] who covers [address] [facility].  Patient denies any equipment needs as she has a walker and bedside commode. Will follow until discharge.   Kennedy Petersen

## 2017-06-12 NOTE — PROGRESS NOTES
06/12/17 1401   Oxygen Therapy   O2 Sat (%) 95 %   Pulse via Oximetry 60 beats per minute   O2 Device Room air  (Patient removed 2L nasal cannula prior to O2 check )   O2 Flow Rate (L/min) (Cannula set to 2L but not on patient )   [address] Notes Reviewed. Pt working on IS. Pt encouraged to do 10 breaths per hour while awake on IS. Good NPC. No respiratory distress noted at this time. No complications noted at this time.

## 2017-06-12 NOTE — PROGRESS NOTES
TRANSFER - IN REPORT:    Verbal report received from [address] [place] [address] (name) on Peter Hernandez  being received from PACU (unit) for routine progression of care      Report consisted of patients Situation, Background, Assessment and   Recommendations(SBAR). Information from the following report(s) SBAR, Kardex, Intake/Output, MAR and Recent Results was reviewed with the receiving nurse. Opportunity for questions and clarification was provided. Assessment will be completed upon patients arrival to unit and care assumed.

## 2017-06-12 NOTE — PERIOP NOTES
TRANSFER - OUT REPORT:    Verbal report given to Staci(name) on Taniya Roach  being transferred to pre-op 1(unit) for routine progression of care       Report consisted of patients Situation, Background, Assessment and   Recommendations(SBAR). Information from the following report(s) SBAR and MAR was reviewed with the receiving nurse. Lines:   Peripheral IV 84/35/24 Left Cephalic (Active)   Site Assessment Clean, dry, & intact 6/12/2017  6:15 AM   Phlebitis Assessment 0 6/12/2017  6:15 AM   Dressing Status Clean, dry, & intact 6/12/2017  6:15 AM   Dressing Type Tape;Transparent 6/12/2017  6:15 AM   Hub Color/Line Status Pink 6/12/2017  6:15 AM        Opportunity for questions and clarification was provided.       Patient transported with:   [facility]

## 2017-06-12 NOTE — ANESTHESIA PROCEDURE NOTES
Spinal Block    Start time: 6/12/2017 7:48 AM  End time: 6/12/2017 7:53 AM  Performed by: Juan Luis Tan  Authorized by: Juan Luis Tan     Pre-procedure:   Indications: at surgeon's request and primary anesthetic  Preanesthetic Checklist: patient identified, risks and benefits discussed, anesthesia consent, patient being monitored and timeout performed    Timeout Time: 07:47          Spinal Block:   Patient Position:  Seated    Prep: chlorhexidine      Location:  L2-3  Technique:  Single shot  Local:  Lidocaine 1%  Local Dose (mL):  5    Needle:   Needle Type:  Pencan  Needle Gauge:  25 G  Attempts:  1      Events: CSF confirmed, no blood with aspiration and no paresthesia        Assessment:  Insertion:  Uncomplicated  Patient tolerance:  Patient tolerated the procedure well with no immediate complications

## 2017-06-12 NOTE — PROGRESS NOTES
Problem: Self Care Deficits Care Plan (Adult)  Goal: *Acute Goals and Plan of Care (Insert Text)  GOALS:   DISCHARGE GOALS (in preparation for going home/rehab): 3 days  1. Ms. Mauricio Bansal will perform one lower body dressing activity with moderate assistance with adaptive equipment to demonstrate improved functional mobility and safety. 2. Ms. Mauricio Bansal will perform one lower body bathing activity with moderate assistance with adaptive equipment to demonstrate improved functional mobility and safety. 3. Ms. Mauricio Bansal will perform toileting/toilet transfer with minimal assistance with adaptive equipment to demonstrate improved functional mobility and safety. 4. Ms. Mauricio Bansal will perform shower transfer with minimal assistance with adaptive equipment to demonstrate improved functional mobility and safety. 5. Ms. Mauricio Bansal will state EDA precautions with two verbal cues to demonstrate improved functional mobility and safety. JOINT CAMP OCCUPATIONAL THERAPY EDA: Initial Assessment 6/12/2017  INPATIENT: Hospital Day: 1  [place] / [place] / Product Type: Medicare /      NAME/AGE/GENDER: Reyna Chua is a 80 y.o. female       PRIMARY DIAGNOSIS:  Primary osteoarthritis of right hip [M16.11]              Procedure(s) and Anesthesia Type:     * RIGHT HIP ARTHROPLASTY TOTAL / Serene Gills - Spinal (Right)  ICD-10: Treatment Diagnosis:        · Pain in Right Hip (M25.551)  · Stiffness of Right Hip, Not elsewhere classified (M25.651)       ASSESSMENT:      Ms. Mauricio Bansal is s/p R EDA and presents with decreased weight bearing on R LE and decreased independence with functional mobility and activities of daily living. Patient would benefit from skilled Occupational Therapy to maximize independence and safety with self-care task and functional mobility.   Pt would also benefit from education on lower body adaptive equipment and hip precautions post-surgery in preparation for going home or for recommendations for post-hospital rehab program.  Patient plans for further rehab at home with home health services and good family support. OT reviewed therapy schedule and plan of care with patient. Patient was able to transfer and perform self care skills as charted below. Patient instructed to call for assistance when needing to get up from the bed and all needs in reach. Patient verbalized understanding of call light. This section established at most recent assessment   PROBLEM LIST (Impairments causing functional limitations):  1. Decreased Strength  2. Decreased ADL/Functional Activities  3. Decreased Transfer Abilities  4. Increased Pain  5. Increased Fatigue  6. Decreased Flexibility/Joint Mobility  7. Decreased Knowledge of Precautions    INTERVENTIONS PLANNED: (Benefits and precautions of occupational therapy have been discussed with the patient.)  1. Activities of daily living training  2. Adaptive equipment training  3. Balance training  4. Clothing management  5. Donning&doffing training  6. Theraputic activity      TREATMENT PLAN: Frequency/Duration: Follow patient qd to address above goals. Rehabilitation Potential For Stated Goals: GOOD      RECOMMENDED REHABILITATION/EQUIPMENT: (at time of discharge pending progress): Continue Skilled Therapy and Rehab. OCCUPATIONAL PROFILE AND HISTORY:   History of Present Injury/Illness (Reason for Referral): Pt presents this date s/p (R) EDA. Past Medical History/Comorbidities:   Ms. Syeda Blank  has a past medical history of Arthritis; Cancer [facility]); CKD (chronic kidney disease); Dyslipidemia; GERD (gastroesophageal reflux disease); UTI (urinary tract infection); Hypertension; LBBB (left bundle branch block); Pacemaker (2009); SSS (sick sinus syndrome) ([address] Status post right hip replacement (6/12/2017); and Thyroid disease. She also has no past medical history of Adverse effect of anesthesia; Aneurysm ([address] Asthma;  Autoimmune disease ([address] CAD (coronary artery disease); Chronic obstructive pulmonary disease ([facility] Coagulation disorder ([facility] Diabetes ([facility] Difficult intubation; Endocarditis; Heart failure ([facility] Liver disease; Malignant hyperthermia due to anesthesia; Nausea & vomiting; Nicotine vapor product user; Non-nicotine vapor product user; Pseudocholinesterase deficiency; Psychiatric disorder; PUD (peptic ulcer disease); Rheumatic fever; Seizures ([facility] Sleep apnea; Stroke [facility]); or Thromboembolus ([facility] Ms. Jose C Corley  has a past surgical history that includes pacemaker; hysterectomy (01/1974); hernia repair (05/24/2011); cataract removal (Bilateral); breast surgery procedure unlisted; bunionectomy (07/28/2006); knee replacement (Bilateral); thyroidectomy (2009); knee arthroscopy (Bilateral); carpal tunnel release (Left, 2003); heart catheterization (09/2001); and pacemaker placement (07/15/2009). Social History/Living Environment:   Home Environment: Private residence  # Steps to Enter: 2  One/Two Story Residence: Two story  # of Interior Steps: 12  Interior Rails: Left  Living Alone: No  Support Systems: Spouse/Significant Other/Partner  Patient Expects to be Discharged to[de-identified] Private residence  Current DME Used/Available at Home: Daune Rued or Shower Type: Tub  Prior Level of Function/Work/Activity:  independent      Number of Personal Factors/Comorbidities that affect the Plan of Care: Expanded review of therapy/medical records (1-2):  MODERATE COMPLEXITY   ASSESSMENT OF OCCUPATIONAL PERFORMANCE[de-identified]   Most Recent Physical Functioning:   Balance  Sitting: High guard  Standing: Pull to stand; With support        Patient Vitals for the past 6 hrs:       BP BP Patient Position SpO2 O2 Flow Rate (L/min) Pulse   06/12/17 0554 149/66 At rest 99 % - 65   06/12/17 0914 95/59 At rest 99 % 2 l/min 60   06/12/17 0915 95/59 - 99 % - (!) 59   06/12/17 0919 101/57 At rest 99 % 2 l/min 60   06/12/17 0924 95/53 At rest 100 % 2 l/min 63   06/12/17 0929 115/59 At rest 100 % 2 l/min 60   06/12/17 0944 110/58 At rest 100 % 2 l/min 60   06/12/17 0959 115/57 At rest 100 % 2 l/min 60   06/12/17 1014 124/59 At rest 99 % 2 l/min 60   06/12/17 1040 117/65 At rest 98 % - 60        Gross Assessment: Yes  Gross Assessment  AROM: Within functional limits (L LE)  Strength: Generally decreased, functional (L LE 3+/5)  Coordination: Generally decreased, functional (BUE)  Tone: Normal  Sensation: Intact (L LE)                    Vision  Acuity: Within Defined Limits     Mental Status  Neurologic State: Drowsy  Orientation Level: Oriented to person  Cognition: Decreased attention/concentration  Perception: Appears intact  Perseveration: No perseveration noted  Safety/Judgement: Fall prevention            RLE AROM  R Hip Flexion: 70  R Hip ABduction: 15  RLE Strength  R Hip Flexion: 2  R Hip ABduction: 2  R Knee Extension: 2+  R Ankle Dorsiflexion: 2+  RLE Sensation  Light Touch: Partial deficit       Basic ADLs (From Assessment) Complex ADLs (From Assessment)   Basic ADL  Feeding: Setup  Oral Facial Hygiene/Grooming: Minimum assistance  Bathing: Moderate assistance  Upper Body Dressing: Minimum assistance  Lower Body Dressing: Maximum assistance  Toileting: Maximum assistance     Grooming/Bathing/Dressing Activities of Daily Living     Cognitive Retraining  Safety/Judgement: Fall prevention                 Functional Transfers  Toilet Transfer : Maximum assistance;Assist x2  Shower Transfer: Maximum assistance;Assist x2     Bed/Mat Mobility  Rolling: Minimum assistance  Supine to Sit: Moderate assistance  Sit to Supine: Moderate assistance  Sit to Stand: Moderate assistance;Assist x2  Bed to Chair: Maximum assistance;Assist x2           Physical Skills Involved:  1. Balance  2. Strength  3. Activity Tolerance Cognitive Skills Affected (resulting in the inability to perform in a timely and safe manner):  1. Immediate Memory Psychosocial Skills Affected:  1.  Habits/Routines   Number of elements that affect the Plan of Care: 3-5:  MODERATE COMPLEXITY   CLINICAL DECISION MAKIN[address] [place] [id number] AM-PAC 6 Clicks   Basic Mobility Inpatient Short Form  How much help from another person does the patient currently need. .. Total A Lot A Little None   1. Putting on and taking off regular lower body clothing?   [ ] 1   [X] 2   [ ] 3   [ ] 4   2. Bathing (including washing, rinsing, drying)? [ ] 1   [X] 2   [ ] 3   [ ] 4   3. Toileting, which includes using toilet, bedpan or urinal?   [ ] 1   [X] 2   [ ] 3   [ ] 4   4. Putting on and taking off regular upper body clothing?   [ ] 1   [X] 2   [ ] 3   [ ] 4   5. Taking care of personal grooming such as brushing teeth? [ ] 1   [ ] 2   [X] 3   [ ] 4   6. Eating meals? [ ] 1   [ ] 2   [X] 3   [ ] 4   © , Trustees of [address] [place] [address], under license to [employer]. All rights reserved   Score:  Initial: 14 Most Recent: X (Date: -- )     Interpretation of Tool:  Represents activities that are increasingly more difficult (i.e. Bed mobility, Transfers, Gait). Score 24 23 22-20 19-15 14-10 9-7 6       Modifier CH CI CJ CK CL CM CN         · Self Care:               - CURRENT STATUS:    CL - 60%-79% impaired, limited or restricted               - GOAL STATUS:           CK - 40%-59% impaired, limited or restricted               - D/C STATUS:                       ---------------To be determined---------------  [place] / Product Type: Medicare /       Medical Necessity:     · Patient is expected to demonstrate progress in strength, balance, coordination and functional technique to increase independence with self care and functional mobility. Reason for Services/Other Comments:  · Patient continues to require skilled intervention due to decrease self care and functional mobility.    Use of outcome tool(s) and clinical judgement create a POC that gives a: LOW COMPLEXITY TREATMENT:   (In addition to Assessment/Re-Assessment sessions the following treatments were rendered)      Pre-treatment Symptoms/Complaints:  Complained of lightheadedness; RN notified  Pain: Initial:   Pain Intensity 1: 0  Post Session:  0      Assessment/Reassessment only, no treatment provided today     Treatment/Session Assessment:         Response to Treatment:  Tolerated fairly     Education:  [ ] Home Exercises  [X] Fall Precautions  [X] Hip Precautions [ ] Going Home Video  [ ] Knee/Hip Prosthesis Review  [X] Walker Management/Safety [X] Adaptive Equipment as Needed         Interdisciplinary Collaboration:   · Physical Therapist  · Occupational Therapist  · Registered Nurse     After treatment position/precautions:   · Supine in bed  · Bed/Chair-wheels locked  · Bed in low position  · Caregiver at bedside  · Call light within reach  · RN notified  · Side rails x 3     Compliance with Program/Exercises: Will assess as treatment progresses. Recommendations/Intent for next treatment session:  Treatment next visit will focus on increasing Ms. Skaggs's independence with bed mobility, transfers, gait training, strength/ROM exercises, self care, modalities for pain, and patient education.        Total Treatment Duration:  OT Patient Time In/Time Out  Time In: 1115  Time Out: [address]

## 2017-06-12 NOTE — OP NOTES
Total Hip Procedure Note    Peetr Hernandez,  [id number],  5/4/1930    Pre-operative Diagnosis:  Primary osteoarthritis of right hip [M16.11]  Post-operative Diagnosis: Status post right hip replacement    Procedure: Procedure(s) (LRB):  RIGHT HIP ARTHROPLASTY TOTAL / [place] (Right)  Location: [address] [place] [address]  Surgeon: Va Lr MD  Assistant: Karrie Baptiste PA-C     Anesthesia: Spinal  Findings: severe degenerative arthritis, acetabular osteophytes and bone spurs around the femoral head. EBL: 300cc  BMI: Body mass index is 20.97 kg/(m^2). Procedure: The complexity of total joint surgery requires the use of a first assistant for positioning, retraction and expertise in closure. Peter Hernandez was brought to the operating room and positioned on the operating table. Anesthesia was administered. A andrews catheter was placed preoperatively and IV antibiotics were administered. A time out identifying the patient, procedure, operative side and surgeon was administered and charted by the OR Nurse. The patient was positioned on the contralateral decubitus position. The limb was prepped and draped in the usual sterile fashion. The Posterior approach was utilized to expose the hip. The incision was carried through the subcutaneous tissue and underlying fascia with hemostasis obtained using the bovie cauterization. A Charmley retractor was inserted. The short external rotators were divided at their insertion. The sciatic nerve was palpated and identified. Next, a T-shaped capsular incision was accomplished and femoral head was dislocated. The neck was osteotomized in the appropriate position just above the lesser trochanteric region. The neck cut was measured. Acetabular retractors were placed and the appropriate capsulotomy performed. Soft tissue was removed from the acetabulum. The acetabulum was sequentially reamed. Using trial components the acetabular component was sized.  The acetabular component was impacted at approximately 40 degrees horizontal tilt and 20 degrees anteversion. No  screws were used to fixate the cup. The real polyethylene liner was impacted into position. Utilizing the femoral retractor, the canal was prepared with appropriate lateralization and reamed with the starter reamer. The canal was then broached progressively to accommodate the DePuy stem. The broach was positioned with the anatomic femoral anteversion. A calcar planar was utilized. Trials were preformed using various neck length until the most suitable one was determined and found to be stable to flexion greater than 90 degrees and on internal and external rotation. Limb lengths were thought to be equilibrated and with appropriate stability as mentioned above. All trial components were removed. The cementless permanent stem was impacted into place. A trial reduction was performed and the above neck length was selected. The permanent ceramic femoral head was impacted into place. The hip was reduced and the stability was as mentioned as above. Copious irrigation was accomplished about the surgical site. The sciatic nerve was palpated and noted to be intact. The capsule was repaired with an absorbable suture and the external rotators were reattached with a #5 Mersilene. The operative hip was injected with 60 cc of Neuropin, 1cc of 10 mg of morphine, and 1 cc of 30 mg of Toradol. The Hip was then soaked with a diluted betadine solution for approximately Min and then thoroughly irrigated. A #1 PDS suture was used to re-approximate the fascia. Then, 1 gram (100 mg/ml) of Transexamic Acid was injected into the joint space. An insorb stapler with absorbable sutures were used to approximate the subcutaneous layers. Staples were applied in an occlusive fashion and a sterile bandage was placed. The patient was then rolled to a supine position. The sponge, needle and instrument counts were correct.  The patient tolerated the procedure without difficulty and left the operating room in satisfactory condition. Implants:   Implant Name Type Inv.  Item Serial No.  Lot No. LRB No. Used   LINER ACET PINN NEUT [id number] -- ALTRX - [id number]  LINER ACET PINN NEUT [id number] -- ALTRX [id number] [facility] [id number] Right 1   CUP ACET PINN 100 W/ 52 --  - [id number]  CUP ACET PINN 100 W/ 52 --  [id number] [facility] [id number] Right 1   HEAD FEM 36MM +1.5MM NK -- BIOLOX DELTA - [id number]  HEAD FEM 36MM +1.5MM NK -- BIOLOX DELTA [id number] [facility] [id number] Right 1   STEM FEM CORAIL STD COL SZ 11 --  - [id number]  STEM FEM CORAIL STD COL  11 --  [id number] [facility] [id number] Right 1   PLUG ACET APCL H ELIM POS STP --  - [id number]   PLUG ACET Driscilla Just POS STP --  [id number] [facility] [id number] Right 1           Signed By: Carlos Alberto Cannon MD  6/12/2017,  9:16 AM                 \

## 2017-06-12 NOTE — ANESTHESIA POSTPROCEDURE EVALUATION
Post-Anesthesia Evaluation and Assessment    Patient: Karlie Fisher MRN: [id number]  SSN: [id number]    YOB: 1930  Age: 80 y.o. Sex: female       Cardiovascular Function/Vital Signs  Visit Vitals    /57 (BP 1 Location: Left arm, BP Patient Position: At rest)    Pulse 60    Temp 36.8 °C (98.3 °F)    Resp 18    Ht 5' 5\" (1.651 m)    Wt 57.2 kg (126 lb)    SpO2 100%    BMI 20.97 kg/m2       Patient is status post spinal anesthesia for Procedure(s):  RIGHT HIP ARTHROPLASTY TOTAL / Lorice Yue. Nausea/Vomiting: None    Postoperative hydration reviewed and adequate. Pain:  Pain Scale 1: Visual (06/12/17 0959)  Pain Intensity 1: 0 (06/12/17 0959)   Managed    Neurological Status:   Neuro (WDL): Exceptions to WDL (06/12/17 0959)  Neuro  Neurologic State: Sleeping (06/12/17 0959)  LUE Motor Response: Purposeful (06/12/17 0959)  LLE Motor Response: Numbness; Pharmacologically paralyzed (06/12/17 0959)  RUE Motor Response: Purposeful (06/12/17 0959)  RLE Motor Response: Numbness; Pharmacologically paralyzed (06/12/17 0959)   At baseline    Mental Status and Level of Consciousness: Arousable    Pulmonary Status:   O2 Device: Nasal cannula (06/12/17 0959)   Adequate oxygenation and airway patent    Complications related to anesthesia: None    Post-anesthesia assessment completed.  No concerns    Signed By: Shea Becerril MD     June 12, 2017

## 2017-06-13 ENCOUNTER — APPOINTMENT (OUTPATIENT)
Dept: GENERAL RADIOLOGY | Age: 82
DRG: 470 | End: 2017-06-13
Attending: ORTHOPAEDIC SURGERY
Payer: MEDICARE

## 2017-06-13 LAB
ANION GAP BLD CALC-SCNC: 8 MMOL/L (ref 7–16)
BUN SERPL-MCNC: 24 MG/DL (ref 8–23)
CALCIUM SERPL-MCNC: 8.8 MG/DL (ref 8.3–10.4)
CHLORIDE SERPL-SCNC: 104 MMOL/L (ref 98–107)
CO2 SERPL-SCNC: 26 MMOL/L (ref 21–32)
CREAT SERPL-MCNC: 1.18 MG/DL (ref 0.6–1)
GLUCOSE SERPL-MCNC: 110 MG/DL (ref 65–100)
HGB BLD-MCNC: 8.1 G/DL (ref 11.7–15.4)
POTASSIUM SERPL-SCNC: 4.2 MMOL/L (ref 3.5–5.1)
SODIUM SERPL-SCNC: 138 MMOL/L (ref 136–145)

## 2017-06-13 PROCEDURE — 74011250637 HC RX REV CODE- 250/637: Performed by: PHYSICIAN ASSISTANT

## 2017-06-13 PROCEDURE — 71010 XR CHEST PORT: CPT

## 2017-06-13 PROCEDURE — 97150 GROUP THERAPEUTIC PROCEDURES: CPT

## 2017-06-13 PROCEDURE — 65270000029 HC RM PRIVATE

## 2017-06-13 PROCEDURE — 97535 SELF CARE MNGMENT TRAINING: CPT

## 2017-06-13 PROCEDURE — 74011250636 HC RX REV CODE- 250/636: Performed by: PHYSICIAN ASSISTANT

## 2017-06-13 PROCEDURE — 97116 GAIT TRAINING THERAPY: CPT

## 2017-06-13 PROCEDURE — 74011250637 HC RX REV CODE- 250/637: Performed by: ORTHOPAEDIC SURGERY

## 2017-06-13 PROCEDURE — 97110 THERAPEUTIC EXERCISES: CPT

## 2017-06-13 PROCEDURE — 94760 N-INVAS EAR/PLS OXIMETRY 1: CPT

## 2017-06-13 PROCEDURE — 36415 COLL VENOUS BLD VENIPUNCTURE: CPT | Performed by: PHYSICIAN ASSISTANT

## 2017-06-13 PROCEDURE — 85018 HEMOGLOBIN: CPT | Performed by: PHYSICIAN ASSISTANT

## 2017-06-13 PROCEDURE — 80048 BASIC METABOLIC PNL TOTAL CA: CPT | Performed by: PHYSICIAN ASSISTANT

## 2017-06-13 RX ADMIN — SENNOSIDES AND DOCUSATE SODIUM 2 TABLET: 8.6; 5 TABLET ORAL at 09:13

## 2017-06-13 RX ADMIN — CEFAZOLIN 2 G: 1 INJECTION, POWDER, FOR SOLUTION INTRAMUSCULAR; INTRAVENOUS; PARENTERAL at 00:06

## 2017-06-13 RX ADMIN — ASPIRIN 325 MG: 325 TABLET, DELAYED RELEASE ORAL at 09:13

## 2017-06-13 RX ADMIN — PANTOPRAZOLE SODIUM 40 MG: 40 TABLET, DELAYED RELEASE ORAL at 05:13

## 2017-06-13 RX ADMIN — ACETAMINOPHEN 1000 MG: 500 TABLET, FILM COATED ORAL at 05:13

## 2017-06-13 RX ADMIN — LEVOTHYROXINE SODIUM 100 MCG: 100 TABLET ORAL at 05:13

## 2017-06-13 RX ADMIN — ACETAMINOPHEN 1000 MG: 500 TABLET, FILM COATED ORAL at 18:53

## 2017-06-13 RX ADMIN — ACETAMINOPHEN 1000 MG: 500 TABLET, FILM COATED ORAL at 11:01

## 2017-06-13 RX ADMIN — BISOPROLOL FUMARATE 10 MG: 10 TABLET ORAL at 09:00

## 2017-06-13 RX ADMIN — ASPIRIN 325 MG: 325 TABLET, DELAYED RELEASE ORAL at 22:00

## 2017-06-13 RX ADMIN — AMLODIPINE BESYLATE: 10 TABLET ORAL at 09:14

## 2017-06-13 RX ADMIN — ACETAMINOPHEN 1000 MG: 500 TABLET, FILM COATED ORAL at 00:06

## 2017-06-13 RX ADMIN — ASPIRIN 325 MG: 325 TABLET, DELAYED RELEASE ORAL at 00:06

## 2017-06-13 NOTE — PROGRESS NOTES
06/13/17 0828   Oxygen Therapy   O2 Sat (%) 92 %   Pulse via Oximetry 80 beats per minute   O2 Device Room air   Good npc. Pt working on IS. Pt encouraged to do 10 breaths per hour while awake on IS. B/S clear. No respiratory distress noted at this time.

## 2017-06-13 NOTE — PROGRESS NOTES
Neurovascular and peripheral vascular checks are WDL to BLE. Instructed not to ambulate without assistance from staff. Pt verbalized understanding. Call light in reach. Confused at times, but easily acclimated to current situation.

## 2017-06-13 NOTE — PROGRESS NOTES
Therapy recommending rehab due to slow progress and increased confusion. Spouse states she was showing some forgetfulness prior to surgery but her confusion now is worse. [facility] agrees [facility] is [facility] Will pursue bed availability for Thurs [facility]-15.   Mina Hyatt

## 2017-06-13 NOTE — PROGRESS NOTES
2017         Post Op day: 1 Day Post-OpProcedure(s) (LRB):  RIGHT HIP ARTHROPLASTY TOTAL / Serene Gills (Right)      Admit Date: 2017  Admit Diagnosis: Primary osteoarthritis of right hip [M16.11]    LAB:    Recent Results (from the past 24 hour(s))   HEMOGLOBIN    Collection Time: 17  9:40 PM   Result Value Ref Range    HGB 8.3 (L) 11.7 - 15.4 g/dL   HEMOGLOBIN    Collection Time: 17  4:55 AM   Result Value Ref Range    HGB 8.1 (L) 11.7 - 02.4 g/dL   METABOLIC PANEL, BASIC    Collection Time: 17  4:55 AM   Result Value Ref Range    Sodium 138 136 - 145 mmol/L    Potassium 4.2 3.5 - 5.1 mmol/L    Chloride 104 98 - 107 mmol/L    CO2 26 21 - 32 mmol/L    Anion gap 8 7 - 16 mmol/L    Glucose 110 (H) 65 - 100 mg/dL    BUN 24 (H) 8 - 23 MG/DL    Creatinine 1.18 (H) 0.6 - 1.0 MG/DL    GFR est AA 56 (L) >60 ml/min/1.73m2    GFR est non-AA 46 (L) >60 ml/min/1.73m2    Calcium 8.8 8.3 - 10.4 MG/DL     Vital Signs:    Patient Vitals for the past 8 hrs:   BP Temp Pulse Resp SpO2   17 0748 140/61 97.2 °F (36.2 °C) 80 16 92 %   17 0500 121/66 99.1 °F (37.3 °C) 74 16 92 %   17 0009 110/52 97.7 °F (36.5 °C) 70 16 94 %     Temp (24hrs), Av.5 °F (36.4 °C), Min:95.3 °F (35.2 °C), Max:99.1 °F (37.3 °C)    Body mass index is 20.97 kg/(m^2).   Pain Control:   Pain Assessment  Pain Scale 1: Numeric (0 - 10)  Pain Intensity 1: 0  Pain Location 1: Hip  Pain Orientation 1: Right  Pain Description 1: Aching, Constant  Pain Intervention(s) 1: Medication (see MAR)    Subjective: Doing well, No complaints, No SOB, No Chest Pain, No Nausea or Vomitting     Objective: Vital Signs are Stable, No Acute Distress, Alert and Oriented, Dressing is Dry,  Neurovascular exam is normal.       PT/OT:            Assistive Device: Walker (comment)  RLE AROM  R Hip Flexion: 70  R Hip ABduction: 15             Weight Bearing Status: WBAT    Meds:  [unfilled]  [unfilled]  [unfilled]    Assessment:   Patient Active Problem List   Diagnosis Code    Status post right hip replacement Z96.641             Plan: Continue Physical Therapy, Monitor  Hbg, home today or tomorrow.         Signed By: Michael Olvera MD

## 2017-06-13 NOTE — PROGRESS NOTES
Patient is alert. Disoriented at times. Easily redirected. Able to verbalize needs. Resting quietly with no distress noted. Dressing to surgical site is dry and intact. Neurovascular and peripheral vascular checks WNL. Dickinson draining clear yellow urine to bag. Denies needs. Bed low and locked. Call light within reach. Instructed to call for assistance. Patient verbalizes understanding. Will monitor.

## 2017-06-13 NOTE — PROGRESS NOTES
Problem: Mobility Impaired (Adult and Pediatric)  Goal: *Acute Goals and Plan of Care (Insert Text)  GOALS (1-4 days):  (1.)Ms. Jose C Corley will move from supine to sit and sit to supine in bed with CONTACT GUARD ASSIST.   (2.)Ms. Jose C Corley will transfer from bed to chair and chair to bed with CONTACT GUARD ASSIST using the least restrictive device. (3.)Ms. Jose C Corley will ambulate with CONTACT GUARD ASSIST for 150 feet with the least restrictive device. (4.)Ms. Skaggs will ambulate up/down 4 steps with bilateral railing with MINIMAL ASSIST with no device. (5.)Ms. Skaggs will state/observe EDA precautions with 0 verbal cues. ________________________________________________________________________________________________      PHYSICAL THERAPY JOINT CAMP EDA: Daily Note and PM 6/13/2017  INPATIENT: Hospital Day: 2  [place] / Product Type: Medicare /      NAME/AGE/GENDER: Darnell Lagos is a 80 y.o. female       PRIMARY DIAGNOSIS:  Primary osteoarthritis of right hip [M16.11]              Procedure(s) and Anesthesia Type:     * RIGHT HIP ARTHROPLASTY TOTAL / Romo Nate - Spinal (Right)  ICD-10: Treatment Diagnosis:        · Pain in Right Hip (M25.551)  · Stiffness of Right Hip, Not elsewhere classified (M25.651)  · Difficulty in walking, Not elsewhere classified (R26.2)  · Other abnormalities of gait and mobility (R26.89)       ASSESSMENT:      Ms. Jose C Corley presents S/P R EDA. She is a bit lethargic. She presents with decreased R LE strength and ROm,s tandign balance, functional mobility and EDA awareness. She does have some cognitive issues Her spouse is a little tearful and appears overwhelmed. She is pleasantly confused. She increase her distance using RW with Min A with verbal cues. She performs exercises in the gym without increase in pain. This section established at most recent assessment   PROBLEM LIST (Impairments causing functional limitations):  1.  Decreased Strength  2. Decreased Transfer Abilities  3. Decreased Ambulation Ability/Technique  4. Decreased Balance  5. Increased Pain  6. Decreased Activity Tolerance  7. Increased Fatigue  8. Decreased Flexibility/Joint Mobility  9. Decreased Knowledge of Precautions  10. Decreased Greer with Home Exercise Program    INTERVENTIONS PLANNED: (Benefits and precautions of physical therapy have been discussed with the patient.)  1. Balance Exercise  2. Bed Mobility  3. Cold  4. Gait Training  5. Home Exercise Program (HEP)  6. Therapeutic Exercise/Strengthening  7. Transfer Training  8. EDA education  9. Range of Motion: active/assisted/passive  10. Therapeutic Activities  11. Group Therapy      TREATMENT PLAN: Frequency/Duration: Follow patient BID   to address above goals. Rehabilitation Potential For Stated Goals: Good      RECOMMENDED REHABILITATION/EQUIPMENT: (at time of discharge pending progress): Rehab. HISTORY:   History of Present Injury/Illness (Reason for Referral):  S/P R EDA  Past Medical History/Comorbidities:   Ms. Temo Corrales  has a past medical history of Arthritis; Cancer [facility]); CKD (chronic kidney disease); Dyslipidemia; GERD (gastroesophageal reflux disease); UTI (urinary tract infection); Hypertension; LBBB (left bundle branch block); Pacemaker (2009); SSS (sick sinus syndrome) ([address] Status post right hip replacement (6/12/2017); and Thyroid disease. She also has no past medical history of Adverse effect of anesthesia; Aneurysm ([address] Asthma; Autoimmune disease ([address] CAD (coronary artery disease); Chronic obstructive pulmonary disease ([address] Coagulation disorder ([address] Diabetes ([address] Difficult intubation; Endocarditis; Heart failure ([address] Liver disease; Malignant hyperthermia due to anesthesia; Nausea & vomiting; Nicotine vapor product user; Non-nicotine vapor product user; Pseudocholinesterase deficiency; Psychiatric disorder; PUD (peptic ulcer disease);  Rheumatic fever; Seizures ([facility] Sleep apnea; Stroke [facility]); or Thromboembolus ([facility] Ms. Wendy Juarez  has a past surgical history that includes pacemaker; hysterectomy (01/1974); hernia repair (05/24/2011); cataract removal (Bilateral); breast surgery procedure unlisted; bunionectomy (07/28/2006); knee replacement (Bilateral); thyroidectomy (2009); knee arthroscopy (Bilateral); carpal tunnel release (Left, 2003); heart catheterization (09/2001); and pacemaker placement (07/15/2009). Social History/Living Environment:   Home Environment: Private residence  # Steps to Enter: 2  One/Two Story Residence: Two story  # of Interior Steps: 12  Interior Rails: Left  Living Alone: No  Support Systems: Spouse/Significant Other/Partner  Patient Expects to be Discharged to[de-identified] Private residence  Current DME Used/Available at Home: [place] or Shower Type: Tub  Prior Level of Function/Work/Activity:  Functionally independent with ADLs and amb   Number of Personal Factors/Comorbidities that affect the Plan of Care: 0: LOW COMPLEXITY   EXAMINATION:   Most Recent Physical Functioning:                               Bed Mobility  Supine to Sit: Minimum assistance  Sit to Supine:  (left up in chair)     Transfers  Sit to Stand: Minimum assistance  Stand to Sit: Minimum assistance     Balance  Sitting: Intact  Standing: Pull to stand; With support                Weight Bearing Status  Right Side Weight Bearing: As tolerated  Distance (ft): 12 Feet (ft)  Ambulation - Level of Assistance: Moderate assistance  Assistive Device: Walker, rolling  Base of Support: Narrowed  Speed/Janet: Slow  Step Length: Left shortened  Stance: Right decreased  Gait Abnormalities: Antalgic; Step to gait  Interventions: Safety awareness training      Braces/Orthotics:      Left Hip Cold  Type: Cold/ice pack       Body Structures Involved:  1. Bones  2. Joints  3. Muscles Body Functions Affected:  1. Neuromusculoskeletal  2.  Movement Related Activities and Participation Affected:  1. Mobility  2. Self Care   Number of elements that affect the Plan of Care: 4+: HIGH COMPLEXITY   CLINICAL PRESENTATION:   Presentation: Stable and uncomplicated: LOW COMPLEXITY   CLINICAL DECISION MAKIN [facility] [address] [address] AM-PAC 6 Clicks   Basic Mobility Inpatient Short Form  How much difficulty does the patient currently have. .. Unable A Lot A Little None   1. Turning over in bed (including adjusting bedclothes, sheets and blankets)? [ ] 1   [ ] 2   [x ] 3   [ ] 4   2. Sitting down on and standing up from a chair with arms ( e.g., wheelchair, bedside commode, etc.)   [ ] 1   [x ] 2   [ ] 3   [ ] 4   3. Moving from lying on back to sitting on the side of the bed? [ ] 1   [x ] 2   [ ] 3   [ ] 4   How much help from another person does the patient currently need. .. Total A Lot A Little None   4. Moving to and from a bed to a chair (including a wheelchair)? [ ] 1   [x ] 2   [ ] 3   [ ] 4   5. Need to walk in hospital room? [x ] 1   [ ] 2   [ ] 3   [ ] 4   6. Climbing 3-5 steps with a railing? [x ] 1   [ ] 2   [ ] 3   [ ] 4   © , Trustees of [address] [facility] [address] [address], under license to [employer]. All rights reserved       Score:  [id number] Most Recent: X (Date: -- )     Interpretation of Tool:  Represents activities that are increasingly more difficult (i.e. Bed mobility, Transfers, Gait). Score 24 23 22-20 19-15 14-10 9-7 6       Modifier CH CI CJ CK CL CM CN        ? Mobility - Walking and Moving Around:     - CURRENT STATUS: CL - 60%-79% impaired, limited or restricted    - GOAL STATUS: CK - 40%-59% impaired, limited or restricted    - D/C STATUS:  ---------------To be determined---------------  [place] / Product Type: Medicare /       Medical Necessity:     · Patient demonstrates fair rehab potential due to higher previous functional level.   Reason for Services/Other Comments:  · Patient continues to require present interventions due to patient's inability to perform functional mobility independently. Use of outcome tool(s) and clinical judgement create a POC that gives a: Clear prediction of patient's progress: LOW COMPLEXITY                 TREATMENT:   (In addition to Assessment/Re-Assessment sessions the following treatments were rendered)      Pre-treatment Symptoms/Complaints:  Feels ok  Pain: Initial:   Pain Intensity 1: 0 (0/10 after therapy)  Post Session:       Gait Training (15 Minutes):  Gait training to improve and/or restore physical functioning as related to mobility. Ambulated 12 Feet (ft) with Moderate assistance using a Walker, rolling and minimal Safety awareness training related to their hip position and motion to promote proper body alignment. Therapeutic Exercise: (45 Minutes (group)):  Exercises per grid below to improve strength. Required minimal verbal cues to promote proper body alignment. Progressed range as indicated. Date:  6/13   Date:    Date:      ACTIVITY/EXERCISE AM PM AM PM AM PM   GROUP THERAPY  [ ]  [x ]  [ ]  [ ]  [ ]  [ ]   Ankle Pumps  15 15            Quad Sets  15  15           Gluteal Sets  15  15           Hip ABd/ADduction  15  15           Straight Leg Raises               Knee Slides  15  15           Short Arc Quads  15  15           Long Arc Quads    15           Chair Slides                               B = bilateral; AA = active assistive; A = active; P = passive       Treatment/Session Assessment:         Response to Treatment:  Became dizzy as we got up. Returned to bed and she felt better. RN informed of situation. .     Education:  [ ] Home Exercises  [x ] Fall Precautions  [x ] Hip Precautions [ ] Going Home Video  [ ] Knee/Hip Prosthesis Review  [x ] Walker Management/Safety [ ] Adaptive Equipment as Needed         Interdisciplinary Collaboration:   · Registered Nurse     After treatment position/precautions:   · Up in chair  · Bed/Chair-wheels locked  · Bed in low position  · Call light within reach  · RN notified  · Family at bedside  · Side rails x 3     Compliance with Program/Exercises: Will assess as treatment progresses. Recommendations/Intent for next treatment session:  Treatment next visit will focus on increasing Ms. Skaggs's independence with bed mobility, transfers, gait training, strength/ROM exercises, modalities for pain, and patient education.        Total Treatment Duration:  PT Patient Time In/Time Out  Time In: 1300  Time Out: 7[address] [facility]

## 2017-06-13 NOTE — PROGRESS NOTES
Neurovascular and peripheral vascular checks are WDL to BLE. Instructed not to ambulate without assistance from staff. Pt verbalized understanding. Call light in reach.

## 2017-06-13 NOTE — PROGRESS NOTES
Problem: Mobility Impaired (Adult and Pediatric)  Goal: *Acute Goals and Plan of Care (Insert Text)  GOALS (1-4 days):  (1.)Ms. Dang Banda will move from supine to sit and sit to supine in bed with CONTACT GUARD ASSIST.   (2.)Ms. Dang Banda will transfer from bed to chair and chair to bed with CONTACT GUARD ASSIST using the least restrictive device. (3.)Ms. Dang Banda will ambulate with CONTACT GUARD ASSIST for 150 feet with the least restrictive device. (4.)Ms. Skaggs will ambulate up/down 4 steps with bilateral railing with MINIMAL ASSIST with no device. (5.)Ms. Skaggs will state/observe EDA precautions with 0 verbal cues. ________________________________________________________________________________________________      PHYSICAL THERAPY JOINT CAMP EDA: Daily Note and AM 6/13/2017  INPATIENT: Hospital Day: 2  [place] / Product Type: Medicare /      NAME/AGE/GENDER: Elvin Narayanan is a 80 y.o. female       PRIMARY DIAGNOSIS:  Primary osteoarthritis of right hip [M16.11]              Procedure(s) and Anesthesia Type:     * RIGHT HIP ARTHROPLASTY TOTAL / Frankie Bullock - Spinal (Right)  ICD-10: Treatment Diagnosis:        · Pain in Right Hip (M25.551)  · Stiffness of Right Hip, Not elsewhere classified (M25.651)  · Difficulty in walking, Not elsewhere classified (R26.2)  · Other abnormalities of gait and mobility (R26.89)       ASSESSMENT:      Ms. Dang Banda presents S/P R EDA. She is a bit lethargic. She presents with decreased R LE strength and ROm,s tandign balance, functional mobility and EDA awareness. She does have some cognitive issues Her spouse is a little tearful and appears overwhelmed. She is pleasantly confused. Needs verbal cues to stay in walker, which foot to move first and stay focused. This section established at most recent assessment   PROBLEM LIST (Impairments causing functional limitations):  1. Decreased Strength  2. Decreased Transfer Abilities  3.  Decreased Ambulation Ability/Technique  4. Decreased Balance  5. Increased Pain  6. Decreased Activity Tolerance  7. Increased Fatigue  8. Decreased Flexibility/Joint Mobility  9. Decreased Knowledge of Precautions  10. Decreased [place] with Home Exercise Program    INTERVENTIONS PLANNED: (Benefits and precautions of physical therapy have been discussed with the patient.)  1. Balance Exercise  2. Bed Mobility  3. Cold  4. Gait Training  5. Home Exercise Program (HEP)  6. Therapeutic Exercise/Strengthening  7. Transfer Training  8. EDA education  9. Range of Motion: active/assisted/passive  10. Therapeutic Activities  11. Group Therapy      TREATMENT PLAN: Frequency/Duration: Follow patient BID   to address above goals. Rehabilitation Potential For Stated Goals: Good      RECOMMENDED REHABILITATION/EQUIPMENT: (at time of discharge pending progress): Rehab. HISTORY:   History of Present Injury/Illness (Reason for Referral):  S/P R EDA  Past Medical History/Comorbidities:   Ms. Alexey Hernandez  has a past medical history of Arthritis; Cancer [facility]); CKD (chronic kidney disease); Dyslipidemia; GERD (gastroesophageal reflux disease); UTI (urinary tract infection); Hypertension; LBBB (left bundle branch block); Pacemaker (2009); SSS (sick sinus syndrome) ([address] Status post right hip replacement (6/12/2017); and Thyroid disease. She also has no past medical history of Adverse effect of anesthesia; Aneurysm ([address] Asthma; Autoimmune disease ([address] CAD (coronary artery disease); Chronic obstructive pulmonary disease ([address] Coagulation disorder ([address] Diabetes ([address] Difficult intubation; Endocarditis; Heart failure ([address] Liver disease; Malignant hyperthermia due to anesthesia; Nausea & vomiting; Nicotine vapor product user; Non-nicotine vapor product user; Pseudocholinesterase deficiency; Psychiatric disorder; PUD (peptic ulcer disease); Rheumatic fever; Seizures ([address]  Sleep apnea; Stroke [facility]); or Thromboembolus [facility]).  Ms. Norbert Rouse  has a past surgical history that includes pacemaker; hysterectomy (01/1974); hernia repair (05/24/2011); cataract removal (Bilateral); breast surgery procedure unlisted; bunionectomy (07/28/2006); knee replacement (Bilateral); thyroidectomy (2009); knee arthroscopy (Bilateral); carpal tunnel release (Left, 2003); heart catheterization (09/2001); and pacemaker placement (07/15/2009). Social History/Living Environment:   Home Environment: Private residence  # Steps to Enter: 2  One/Two Story Residence: Two story  # of Interior Steps: 12  Interior Rails: Left  Living Alone: No  Support Systems: Spouse/Significant Other/Partner  Patient Expects to be Discharged to[de-identified] Private residence  Current DME Used/Available at Home: Loran Harley or Shower Type: Tub  Prior Level of Function/Work/Activity:  Functionally independent with ADLs and amb   Number of Personal Factors/Comorbidities that affect the Plan of Care: 0: LOW COMPLEXITY   EXAMINATION:   Most Recent Physical Functioning:                               Bed Mobility  Supine to Sit: Minimum assistance  Sit to Supine: Minimum assistance     Transfers  Sit to Stand: Minimum assistance  Stand to Sit: Minimum assistance                      Weight Bearing Status  Right Side Weight Bearing: As tolerated  Distance (ft): 6 Feet (ft)  Ambulation - Level of Assistance: Moderate assistance  Assistive Device: Walker, rolling  Base of Support: Narrowed  Speed/Janet: Slow  Step Length: Left shortened  Stance: Right decreased  Gait Abnormalities: Antalgic; Step to gait  Interventions: Safety awareness training      Braces/Orthotics:      Left Hip Cold  Type: Cold/ice pack       Body Structures Involved:  1. Bones  2. Joints  3. Muscles Body Functions Affected:  1. Neuromusculoskeletal  2. Movement Related Activities and Participation Affected:  1. Mobility  2.  Self Care   Number of elements that affect the Plan of Care: 4+: HIGH COMPLEXITY   CLINICAL PRESENTATION: Presentation: Stable and uncomplicated: LOW COMPLEXITY   CLINICAL DECISION MAKIN[address] [place] [id number] AM-PAC 6 Clicks   Basic Mobility Inpatient Short Form  How much difficulty does the patient currently have. .. Unable A Lot A Little None   1. Turning over in bed (including adjusting bedclothes, sheets and blankets)? [ ] 1   [ ] 2   [x ] 3   [ ] 4   2. Sitting down on and standing up from a chair with arms ( e.g., wheelchair, bedside commode, etc.)   [ ] 1   [x ] 2   [ ] 3   [ ] 4   3. Moving from lying on back to sitting on the side of the bed? [ ] 1   [x ] 2   [ ] 3   [ ] 4   How much help from another person does the patient currently need. .. Total A Lot A Little None   4. Moving to and from a bed to a chair (including a wheelchair)? [ ] 1   [x ] 2   [ ] 3   [ ] 4   5. Need to walk in hospital room? [x ] 1   [ ] 2   [ ] 3   [ ] 4   6. Climbing 3-5 steps with a railing? [x ] 1   [ ] 2   [ ] 3   [ ] 4   © 2007, Trustees of [address] [place] [id number], under license to [employer]. All rights reserved       Score:  [id number] Most Recent: X (Date: -- )     Interpretation of Tool:  Represents activities that are increasingly more difficult (i.e. Bed mobility, Transfers, Gait). Score 24 23 22-20 19-15 14-10 9-7 6       Modifier CH CI CJ CK CL CM CN        ? Mobility - Walking and Moving Around:     - CURRENT STATUS: CL - 60%-79% impaired, limited or restricted    - GOAL STATUS: CK - 40%-59% impaired, limited or restricted    - D/C STATUS:  ---------------To be determined---------------  [place] / Product Type: Medicare /       Medical Necessity:     · Patient demonstrates fair rehab potential due to higher previous functional level. Reason for Services/Other Comments:  · Patient continues to require present interventions due to patient's inability to perform functional mobility independently.    Use of outcome tool(s) and clinical judgement create a POC that gives a: Clear prediction of patient's progress: LOW COMPLEXITY                 TREATMENT:   (In addition to Assessment/Re-Assessment sessions the following treatments were rendered)      Pre-treatment Symptoms/Complaints:  Feels ok  Pain: Initial:      Post Session:       Gait Training (15 Minutes):  Gait training to improve and/or restore physical functioning as related to mobility. Ambulated 6 Feet (ft) with Moderate assistance using a Walker, rolling and minimal Safety awareness training related to their hip position and motion to promote proper body alignment. Therapeutic Exercise: (15 Minutes):  Exercises per grid below to improve strength. Required minimal verbal cues to promote proper body alignment. Progressed range as indicated. Date:  6/13   Date:    Date:      ACTIVITY/EXERCISE AM PM AM PM AM PM   GROUP THERAPY  [ ]  [ ]  [ ]  [ ]  [ ]  [ ]   Ankle Pumps  15             Quad Sets  15             Gluteal Sets  15             Hip ABd/ADduction  15             Straight Leg Raises               Knee Slides  15             Short Arc Quads  15             Long Arc Quads               Chair Slides                               B = bilateral; AA = active assistive; A = active; P = passive       Treatment/Session Assessment:         Response to Treatment:  Became dizzy as we got up. Returned to bed and she felt better. RN informed of situation. .     Education:  [ ] Home Exercises  [x ] Fall Precautions  [x ] Hip Precautions [ ] Going Home Video  [ ] Knee/Hip Prosthesis Review  [x ] Walker Management/Safety [ ] Adaptive Equipment as Needed         Interdisciplinary Collaboration:   · Registered Nurse     After treatment position/precautions:   · Supine in bed  · Bed/Chair-wheels locked  · Bed in low position  · Call light within reach  · RN notified  · Family at bedside  · Side rails x 3     Compliance with Program/Exercises: Will assess as treatment progresses. Recommendations/Intent for next treatment session:  Treatment next visit will focus on increasing Ms. Skaggs's independence with bed mobility, transfers, gait training, strength/ROM exercises, modalities for pain, and patient education.        Total Treatment Duration:  PT Patient Time In/Time Out  Time In: 0745  Time Out: 0815     Chelita Kumar, PTA

## 2017-06-13 NOTE — PROGRESS NOTES
Problem: Self Care Deficits Care Plan (Adult)  Goal: *Acute Goals and Plan of Care (Insert Text)  GOALS:   DISCHARGE GOALS (in preparation for going home/rehab): 3 days  1. Ms. Eugene Beverly will perform one lower body dressing activity with moderate assistance with adaptive equipment to demonstrate improved functional mobility and safety. GOAL MET 6/13/2017  2. Ms. Eugene Beverly will perform one lower body bathing activity with moderate assistance with adaptive equipment to demonstrate improved functional mobility and safety. Progressing   3. Ms. Eugene Beverly will perform toileting/toilet transfer with minimal assistance with adaptive equipment to demonstrate improved functional mobility and safety. progressing  4. Ms. Eugene Beverly will perform shower transfer with minimal assistance with adaptive equipment to demonstrate improved functional mobility and safety. Progressing   5. Ms. Eugene Beverly will state EDA precautions with two verbal cues to demonstrate improved functional mobility and safety. D/C goal pt with baseline confusion and will not be able to remember hip precautions          JOINT CAMP OCCUPATIONAL THERAPY EDA: Daily Note, Treatment Day: 1st and AM 6/13/2017  INPATIENT: Hospital Day: 2  [place] / Product Type: Medicare /      NAME/AGE/GENDER: Renea Walters is a 80 y.o. female       PRIMARY DIAGNOSIS:  Primary osteoarthritis of right hip [M16.11]              Procedure(s) and Anesthesia Type:     * RIGHT HIP ARTHROPLASTY TOTAL / Eunice Wilmar - Spinal (Right)  ICD-10: Treatment Diagnosis:        · Pain in Right Hip (M25.551)  · Stiffness of Right Hip, Not elsewhere classified (M25.651)       ASSESSMENT:   Ms. Eugene Beverly is s/p right EDA and presents with decreased weight bearing on right LE and decreased independence with functional mobility and activities of daily living.   Patient completed shower and dressing as charter below in ADL grid and is ambulating with rolling walker and moderate assist.  Pt is noted to have moderate confusion but is pleasant. If is difficulty for her to follow commands for mobility and therefore she is not safe. Patient has met 1/5 goals. Pt will benefit from short term rehab as she requires too much assistance for her elderly .  agreed and SS notified. Patient instructed to call for assistance when needing to get up from recliner and all needs in reach but posey pad in place with alarm on. This section established at most recent assessment   PROBLEM LIST (Impairments causing functional limitations):  1. Decreased Strength  2. Decreased ADL/Functional Activities  3. Decreased Transfer Abilities  4. Increased Pain  5. Increased Fatigue  6. Decreased Flexibility/Joint Mobility  7. Decreased Knowledge of Precautions    INTERVENTIONS PLANNED: (Benefits and precautions of occupational therapy have been discussed with the patient.)  1. Activities of daily living training  2. Adaptive equipment training  3. Balance training  4. Clothing management  5. Donning&doffing training  6. Theraputic activity      TREATMENT PLAN: Frequency/Duration: Follow patient qd to address above goals. Rehabilitation Potential For Stated Goals: GOOD      RECOMMENDED REHABILITATION/EQUIPMENT: (at time of discharge pending progress): Continue Skilled Therapy and Rehab. OCCUPATIONAL PROFILE AND HISTORY:   History of Present Injury/Illness (Reason for Referral): Pt presents this date s/p (R) EDA. Past Medical History/Comorbidities:   Ms. Florentino   has a past medical history of Arthritis; Cancer [facility]); CKD (chronic kidney disease); Dyslipidemia; GERD (gastroesophageal reflux disease); UTI (urinary tract infection); Hypertension; LBBB (left bundle branch block); Pacemaker (2009); SSS (sick sinus syndrome) ([address] Status post right hip replacement (6/12/2017); and Thyroid disease. She also has no past medical history of Adverse effect of anesthesia; Aneurysm ([address]  Asthma; Autoimmune disease ([facility] CAD (coronary artery disease); Chronic obstructive pulmonary disease ([facility] Coagulation disorder ([facility] Diabetes ([facility] Difficult intubation; Endocarditis; Heart failure ([facility] Liver disease; Malignant hyperthermia due to anesthesia; Nausea & vomiting; Nicotine vapor product user; Non-nicotine vapor product user; Pseudocholinesterase deficiency; Psychiatric disorder; PUD (peptic ulcer disease); Rheumatic fever; Seizures ([facility] Sleep apnea; Stroke [facility]); or Thromboembolus ([facility] Ms. Florentino   has a past surgical history that includes pacemaker; hysterectomy (01/1974); hernia repair (05/24/2011); cataract removal (Bilateral); breast surgery procedure unlisted; bunionectomy (07/28/2006); knee replacement (Bilateral); thyroidectomy (2009); knee arthroscopy (Bilateral); carpal tunnel release (Left, 2003); heart catheterization (09/2001); and pacemaker placement (07/15/2009). Social History/Living Environment:   Home Environment: Private residence  # Steps to Enter: 2  One/Two Story Residence: Two story  # of Interior Steps: 12  Interior Rails: Left  Living Alone: No  Support Systems: Spouse/Significant Other/Partner  Patient Expects to be Discharged to[de-identified] Private residence  Current DME Used/Available at Home: Laverne Christelle or Shower Type: Tub  Prior Level of Function/Work/Activity:  independent      Number of Personal Factors/Comorbidities that affect the Plan of Care: Expanded review of therapy/medical records (1-2):  MODERATE COMPLEXITY   ASSESSMENT OF OCCUPATIONAL PERFORMANCE[de-identified]   Most Recent Physical Functioning:   Balance  Sitting: Intact  Standing: Pull to stand; With support        Patient Vitals for the past 6 hrs:   BP BP Patient Position SpO2 Pulse   06/13/17 0748 140/61 At rest 92 % 80   06/13/17 0828 - - 92 % -   06/13/17 1208 119/50 At rest 98 % 75                                   Mental Status  Neurologic State: Alert;Confused  Orientation Level: Oriented to person;Disoriented to situation;Disoriented to time;Oriented to place  Cognition: Decreased attention/concentration;Decreased command following; Impaired decision making; Impulsive;Poor safety awareness  Perception: Appears intact  Perseveration: No perseveration noted  Safety/Judgement: Fall prevention                    Basic ADLs (From Assessment) Complex ADLs (From Assessment)   Basic ADL  Feeding: Setup  Oral Facial Hygiene/Grooming: Minimum assistance  Bathing: Moderate assistance  Upper Body Dressing: Minimum assistance  Lower Body Dressing: Maximum assistance  Toileting: Maximum assistance     Grooming/Bathing/Dressing Activities of Daily Living   Grooming  Grooming Assistance: Stand-by assistance (seated with verbal cues) Cognitive Retraining  Safety/Judgement: Fall prevention   Upper Body Bathing  Bathing Assistance: Minimum assistance  Position Performed: Seated in chair  Cues: Verbal cues provided  Adaptive Equipment: Shower chair;Grab bar;Walker     Lower Body Bathing  Bathing Assistance: Moderate assistance  Perineal  : Moderate assistance  Position Performed: Standing  Cues: Tactile cues provided;Verbal cues provided  Adaptive Equipment: Grab bar;Walker  Lower Body : Maximum assistance  Position Performed: Seated in chair  Cues: Verbal cues provided  Adaptive Equipment: Grab bar; Shower chair;Walker Toileting  Toileting Assistance: Minimum assistance  Bladder Hygiene: Minimum assistance  Clothing Management: Moderate assistance  Cues: Physical assistance for pants up;Physical assistance for pants down; Tactile cues provided;Verbal cues provided  Adaptive Equipment: Elevated seat;Grab bars; Walker   Upper Body Dressing Assistance  Dressing Assistance: Contact guard assistance  Cues: Verbal cues provided Functional Transfers  Bathroom Mobility: Moderate assistance  Toilet Transfer : Moderate assistance  Shower Transfer: Moderate assistance  Cues: Verbal cues provided  Adaptive Equipment: Grab bars; Shower chair with back; Emmanuel Bowman (comment)   Lower Body Dressing Assistance  Dressing Assistance: Moderate assistance  Position Performed: Seated in chair;Standing  Cues: Verbal cues provided;Physical assistance  Adaptive Equipment Used: Grab bar;Walker Bed/Mat Mobility  Supine to Sit: Minimum assistance  Sit to Supine: Minimum assistance  Sit to Stand: Minimum assistance           Physical Skills Involved:  1. Balance  2. Strength  3. Activity Tolerance Cognitive Skills Affected (resulting in the inability to perform in a timely and safe manner):  1. Immediate Memory Psychosocial Skills Affected:  1. Habits/Routines   Number of elements that affect the Plan of Care: 3-5:  MODERATE COMPLEXITY   CLINICAL DECISION MAKIN[address] [place] [address] AM-PAC 6 Clicks   Basic Mobility Inpatient Short Form  How much help from another person does the patient currently need. .. Total A Lot A Little None   1. Putting on and taking off regular lower body clothing?   [ ] 1   [X] 2   [ ] 3   [ ] 4   2. Bathing (including washing, rinsing, drying)? [ ] 1   [X] 2   [ ] 3   [ ] 4   3. Toileting, which includes using toilet, bedpan or urinal?   [ ] 1   [X] 2   [ ] 3   [ ] 4   4. Putting on and taking off regular upper body clothing?   [ ] 1   [X] 2   [ ] 3   [ ] 4   5. Taking care of personal grooming such as brushing teeth? [ ] 1   [ ] 2   [X] 3   [ ] 4   6. Eating meals? [ ] 1   [ ] 2   [X] 3   [ ] 4   © , Trustees of [address] [place] [address], under license to [employer]. All rights reserved   Score:  Initial: 14 Most Recent: X (Date: -- )     Interpretation of Tool:  Represents activities that are increasingly more difficult (i.e. Bed mobility, Transfers, Gait).        Score 24 23 22-20 19-15 14-10 9-7 6       Modifier CH CI CJ CK CL CM CN         · Self Care:               - CURRENT STATUS:    CL - 60%-79% impaired, limited or restricted               - GOAL STATUS:           CK - 40%-59% impaired, limited or restricted               - D/C STATUS:                       ---------------To be determined---------------  [place] / Product Type: Medicare /       Medical Necessity:     · Patient is expected to demonstrate progress in strength, balance, coordination and functional technique to increase independence with self care and functional mobility. Reason for Services/Other Comments:  · Patient continues to require skilled intervention due to decrease self care and functional mobility. Use of outcome tool(s) and clinical judgement create a POC that gives a: LOW COMPLEXITY                 TREATMENT:   (In addition to Assessment/Re-Assessment sessions the following treatments were rendered)      Pre-treatment Symptoms/Complaints:  Complained of lightheadedness; RN notified  Pain: Initial:   Pain Intensity 1: 0  Post Session:  0      Self Care: (60min): Procedure(s) (per grid) utilized to improve and/or restore self-care/home management as related to dressing, bathing, toileting and grooming. Required moderate visual, verbal, tactile and   cueing to facilitate activities of daily living skills and compensatory activities. Treatment/Session Assessment:         Response to Treatment:  Pt up to shower and tolerated well despite age and confusion      Education:  [ ] Home Exercises  [X] Fall Precautions  [X] Hip Precautions [ ] Going Home Video  [ ] Knee/Hip Prosthesis Review  [X] Walker Management/Safety [X] Adaptive Equipment as Needed         Interdisciplinary Collaboration:   · Physical Therapy Assistant, Occupational Therapist and Registered Nurse     After treatment position/precautions:   · Up in chair, Bed alarm/tab alert on, Bed/Chair-wheels locked, Call light within reach and RN notified     Compliance with Program/Exercises: Will assess as treatment progresses. Recommendations/Intent for next treatment session:  Treatment next visit will focus on increasing Ms. Skaggs's independence with bed mobility, transfers, gait training, strength/ROM exercises, self care, modalities for pain, and patient education.        Total Treatment Duration:  OT Patient Time In/Time Out  Time In: 0900  Time Out: [address] [place], OT

## 2017-06-14 LAB — HGB BLD-MCNC: 7.8 G/DL (ref 11.7–15.4)

## 2017-06-14 PROCEDURE — 74011250637 HC RX REV CODE- 250/637: Performed by: PHYSICIAN ASSISTANT

## 2017-06-14 PROCEDURE — 85018 HEMOGLOBIN: CPT | Performed by: PHYSICIAN ASSISTANT

## 2017-06-14 PROCEDURE — 65270000029 HC RM PRIVATE

## 2017-06-14 PROCEDURE — 97116 GAIT TRAINING THERAPY: CPT

## 2017-06-14 PROCEDURE — 74011250637 HC RX REV CODE- 250/637: Performed by: ORTHOPAEDIC SURGERY

## 2017-06-14 PROCEDURE — 97110 THERAPEUTIC EXERCISES: CPT

## 2017-06-14 PROCEDURE — 36415 COLL VENOUS BLD VENIPUNCTURE: CPT | Performed by: PHYSICIAN ASSISTANT

## 2017-06-14 PROCEDURE — 97150 GROUP THERAPEUTIC PROCEDURES: CPT

## 2017-06-14 RX ORDER — LANOLIN ALCOHOL/MO/W.PET/CERES
325 CREAM (GRAM) TOPICAL
Qty: 90 TAB | Refills: 0 | Status: SHIPPED | OUTPATIENT
Start: 2017-06-14

## 2017-06-14 RX ORDER — LANOLIN ALCOHOL/MO/W.PET/CERES
1 CREAM (GRAM) TOPICAL
Status: DISCONTINUED | OUTPATIENT
Start: 2017-06-14 | End: 2017-06-16 | Stop reason: HOSPADM

## 2017-06-14 RX ADMIN — ACETAMINOPHEN 1000 MG: 500 TABLET, FILM COATED ORAL at 06:47

## 2017-06-14 RX ADMIN — ACETAMINOPHEN 1000 MG: 500 TABLET, FILM COATED ORAL at 12:00

## 2017-06-14 RX ADMIN — Medication 10 ML: at 21:55

## 2017-06-14 RX ADMIN — AMLODIPINE BESYLATE: 10 TABLET ORAL at 09:46

## 2017-06-14 RX ADMIN — PANTOPRAZOLE SODIUM 40 MG: 40 TABLET, DELAYED RELEASE ORAL at 06:47

## 2017-06-14 RX ADMIN — ACETAMINOPHEN 1000 MG: 500 TABLET, FILM COATED ORAL at 01:30

## 2017-06-14 RX ADMIN — SENNOSIDES AND DOCUSATE SODIUM 2 TABLET: 8.6; 5 TABLET ORAL at 09:45

## 2017-06-14 RX ADMIN — ASPIRIN 325 MG: 325 TABLET, DELAYED RELEASE ORAL at 21:52

## 2017-06-14 RX ADMIN — BISOPROLOL FUMARATE 10 MG: 10 TABLET ORAL at 09:46

## 2017-06-14 RX ADMIN — LEVOTHYROXINE SODIUM 100 MCG: 100 TABLET ORAL at 06:47

## 2017-06-14 RX ADMIN — ASPIRIN 325 MG: 325 TABLET, DELAYED RELEASE ORAL at 09:47

## 2017-06-14 RX ADMIN — FERROUS SULFATE TAB 325 MG (65 MG ELEMENTAL FE) 325 MG: 325 (65 FE) TAB at 08:00

## 2017-06-14 NOTE — PROGRESS NOTES
Problem: Mobility Impaired (Adult and Pediatric)  Goal: *Acute Goals and Plan of Care (Insert Text)  GOALS (1-4 days):  (1.)Ms. Gill Parham will move from supine to sit and sit to supine in bed with CONTACT GUARD ASSIST.   (2.)Ms. Gill Parham will transfer from bed to chair and chair to bed with CONTACT GUARD ASSIST using the least restrictive device. (3.)Ms. Gill Parham will ambulate with CONTACT GUARD ASSIST for 150 feet with the least restrictive device. (4.)Ms. Skaggs will ambulate up/down 4 steps with bilateral railing with MINIMAL ASSIST with no device. (5.)Ms. Skaggs will state/observe EDA precautions with 0 verbal cues. ________________________________________________________________________________________________      PHYSICAL THERAPY JOINT CAMP EDA: Daily Note and PM 6/14/2017  INPATIENT: Hospital Day: 3  [place] / Product Type: Medicare /      NAME/AGE/GENDER: Anders Lebron is a 80 y.o. female       PRIMARY DIAGNOSIS:  Primary osteoarthritis of right hip [M16.11]              Procedure(s) and Anesthesia Type:     * RIGHT HIP ARTHROPLASTY TOTAL / Marylouise [place] - Spinal (Right)  ICD-10: Treatment Diagnosis:        · Pain in Right Hip (M25.551)  · Stiffness of Right Hip, Not elsewhere classified (M25.651)  · Difficulty in walking, Not elsewhere classified (R26.2)  · Other abnormalities of gait and mobility (R26.89)       ASSESSMENT:      Ms. Gill Parham presents S/P R EDA. She is a bit lethargic. She presents with decreased R LE strength and ROm,s tandign balance, functional mobility and EDA awareness. She does have some cognitive issues Her spouse is a little tearful and appears overwhelmed. She is pleasantly confused. She performs exercises in the chair without any problems. She continue to make good progress with gait. This section established at most recent assessment   PROBLEM LIST (Impairments causing functional limitations):  1. Decreased Strength  2.  Decreased Transfer Abilities  3. Decreased Ambulation Ability/Technique  4. Decreased Balance  5. Increased Pain  6. Decreased Activity Tolerance  7. Increased Fatigue  8. Decreased Flexibility/Joint Mobility  9. Decreased Knowledge of Precautions  10. Decreased [place] with Home Exercise Program    INTERVENTIONS PLANNED: (Benefits and precautions of physical therapy have been discussed with the patient.)  1. Balance Exercise  2. Bed Mobility  3. Cold  4. Gait Training  5. Home Exercise Program (HEP)  6. Therapeutic Exercise/Strengthening  7. Transfer Training  8. EDA education  9. Range of Motion: active/assisted/passive  10. Therapeutic Activities  11. Group Therapy      TREATMENT PLAN: Frequency/Duration: Follow patient BID   to address above goals. Rehabilitation Potential For Stated Goals: Good      RECOMMENDED REHABILITATION/EQUIPMENT: (at time of discharge pending progress): Rehab. HISTORY:   History of Present Injury/Illness (Reason for Referral):  S/P R EDA  Past Medical History/Comorbidities:   Ms. Gill Parham  has a past medical history of Arthritis; Cancer [facility]); CKD (chronic kidney disease); Dyslipidemia; GERD (gastroesophageal reflux disease); UTI (urinary tract infection); Hypertension; LBBB (left bundle branch block); Pacemaker (2009); SSS (sick sinus syndrome) ([address] Status post right hip replacement (6/12/2017); and Thyroid disease. She also has no past medical history of Adverse effect of anesthesia; Aneurysm ([address] Asthma; Autoimmune disease ([address] CAD (coronary artery disease); Chronic obstructive pulmonary disease ([address] Coagulation disorder ([address] Diabetes ([address] Difficult intubation; Endocarditis; Heart failure ([address] Liver disease; Malignant hyperthermia due to anesthesia; Nausea & vomiting; Nicotine vapor product user; Non-nicotine vapor product user; Pseudocholinesterase deficiency; Psychiatric disorder; PUD (peptic ulcer disease); Rheumatic fever; Seizures ([address]  Sleep apnea; Stroke ([facility] or Thromboembolus ([facility] Ms. Alek Rowland  has a past surgical history that includes pacemaker; hysterectomy (01/1974); hernia repair (05/24/2011); cataract removal (Bilateral); breast surgery procedure unlisted; bunionectomy (07/28/2006); knee replacement (Bilateral); thyroidectomy (2009); knee arthroscopy (Bilateral); carpal tunnel release (Left, 2003); heart catheterization (09/2001); and pacemaker placement (07/15/2009). Social History/Living Environment:   Home Environment: Private residence  # Steps to Enter: 2  One/Two Story Residence: Two story  # of Interior Steps: 12  Interior Rails: Left  Living Alone: No  Support Systems: Spouse/Significant Other/Partner  Patient Expects to be Discharged to[de-identified] Private residence  Current DME Used/Available at Home: Madhuri Dimas or Shower Type: Tub  Prior Level of Function/Work/Activity:  Functionally independent with ADLs and amb   Number of Personal Factors/Comorbidities that affect the Plan of Care: 0: LOW COMPLEXITY   EXAMINATION:   Most Recent Physical Functioning:                               Bed Mobility  Sit to Supine: Minimum assistance     Transfers  Sit to Stand: Minimum assistance  Stand to Sit: Minimum assistance                      Weight Bearing Status  Right Side Weight Bearing: As tolerated  Distance (ft): 160 Feet (ft)  Ambulation - Level of Assistance: Minimal assistance  Assistive Device: Walker, rolling  Base of Support: Narrowed  Speed/Janet: Slow  Step Length: Left shortened  Stance: Right decreased  Gait Abnormalities: Antalgic; Step to gait  Interventions: Safety awareness training      Braces/Orthotics:      Left Hip Cold  Type: Cold/ice pack       Body Structures Involved:  1. Bones  2. Joints  3. Muscles Body Functions Affected:  1. Neuromusculoskeletal  2. Movement Related Activities and Participation Affected:  1. Mobility  2.  Self Care   Number of elements that affect the Plan of Care: 4+: HIGH COMPLEXITY   CLINICAL PRESENTATION: Presentation: Stable and uncomplicated: LOW COMPLEXITY   CLINICAL DECISION MAKIN[address] [place] [id number] AM-PAC 6 Clicks   Basic Mobility Inpatient Short Form  How much difficulty does the patient currently have. .. Unable A Lot A Little None   1. Turning over in bed (including adjusting bedclothes, sheets and blankets)? [ ] 1   [ ] 2   [x ] 3   [ ] 4   2. Sitting down on and standing up from a chair with arms ( e.g., wheelchair, bedside commode, etc.)   [ ] 1   [x ] 2   [ ] 3   [ ] 4   3. Moving from lying on back to sitting on the side of the bed? [ ] 1   [x ] 2   [ ] 3   [ ] 4   How much help from another person does the patient currently need. .. Total A Lot A Little None   4. Moving to and from a bed to a chair (including a wheelchair)? [ ] 1   [x ] 2   [ ] 3   [ ] 4   5. Need to walk in hospital room? [x ] 1   [ ] 2   [ ] 3   [ ] 4   6. Climbing 3-5 steps with a railing? [x ] 1   [ ] 2   [ ] 3   [ ] 4   © 2007, Trustees of [address] [place] [id number], under license to [employer]. All rights reserved       Score:  [id number] Most Recent: X (Date: -- )     Interpretation of Tool:  Represents activities that are increasingly more difficult (i.e. Bed mobility, Transfers, Gait). Score 24 23 22-20 19-15 14-10 9-7 6       Modifier CH CI CJ CK CL CM CN        ? Mobility - Walking and Moving Around:     - CURRENT STATUS: CL - 60%-79% impaired, limited or restricted    - GOAL STATUS: CK - 40%-59% impaired, limited or restricted    - D/C STATUS:  ---------------To be determined---------------  [place] / Product Type: Medicare /       Medical Necessity:     · Patient demonstrates fair rehab potential due to higher previous functional level. Reason for Services/Other Comments:  · Patient continues to require present interventions due to patient's inability to perform functional mobility independently.    Use of outcome tool(s) and clinical judgement create a POC that gives a: Clear prediction of patient's progress: LOW COMPLEXITY                 TREATMENT:   (In addition to Assessment/Re-Assessment sessions the following treatments were rendered)      Pre-treatment Symptoms/Complaints:  Feels ok  Pain: Initial:   Pain Intensity 1: 0 (0/10 after therapy)  Post Session:       Gait Training (15 Minutes):  Gait training to improve and/or restore physical functioning as related to mobility. Ambulated 160 Feet (ft) with Minimal assistance using a Walker, rolling and minimal Safety awareness training related to their hip position and motion to promote proper body alignment. Therapeutic Exercise: (15 Minutes):  Exercises per grid below to improve strength. Required minimal verbal cues to promote proper body alignment. Progressed range as indicated. Date:  6/13   Date:  6/14    Date:      ACTIVITY/EXERCISE AM PM AM PM AM PM   GROUP THERAPY  [ ]  [x ]  [x ]  [ ]  [ ]  [ ]   Ankle Pumps  15 15  20   20       Quad Sets  15  15  20  20       Gluteal Sets  15  15  20  20       Hip ABd/ADduction  15  15  20  20       Straight Leg Raises               Knee Slides  15  15  20  20       Short Arc Quads  15  15  20  20       Long Arc Quads    15  20  20       Chair Slides                               B = bilateral; AA = active assistive; A = active; P = passive       Treatment/Session Assessment:         Response to Treatment:  Became dizzy as we got up. Returned to bed and she felt better. RN informed of situation. .     Education:  [ ] Home Exercises  [x ] Fall Precautions  [x ] Hip Precautions [ ] Going Home Video  [ ] Knee/Hip Prosthesis Review  [x ] Walker Management/Safety [ ] Adaptive Equipment as Needed         Interdisciplinary Collaboration:   · Registered Nurse     After treatment position/precautions:   · Supine in bed  · Bed/Chair-wheels locked  · Bed in low position  · Call light within reach  · RN notified  · Family at bedside  · Side rails x 3 Compliance with Program/Exercises: Will assess as treatment progresses. Recommendations/Intent for next treatment session:  Treatment next visit will focus on increasing Ms. Skaggs's independence with bed mobility, transfers, gait training, strength/ROM exercises, modalities for pain, and patient education.        Total Treatment Duration:  PT Patient Time In/Time Out  Time In: 1300  Time Out: [address] Aidene José, PTA

## 2017-06-14 NOTE — PROGRESS NOTES
Patient is alert. Confused most of the time. Able to verbalize needs. Resting quietly with no distress noted. Dressing to surgical site is dry and intact. Neurovascular and peripheral vascular checks WNL. Voiding clear yellow urine. Ambulates with walker. Denies needs. Bed low and locked. Bed alarm on. Call light within reach. Instructed to call for assistance. Patient verbalizes understanding. Will monitor.

## 2017-06-14 NOTE — PROGRESS NOTES
Pt is sitting in recliner. Oriented x 4. Spouse is at bedside. Denies needs. Vance alarm is set on recliner.

## 2017-06-14 NOTE — PROGRESS NOTES
Pt very confused. Oriented to person only. Reoriented pt to place, time, and situation. Held hand and comforted pt. Pt is responding that she feels a little better. Explained spouse had spent the night and would be back later.

## 2017-06-14 NOTE — PROGRESS NOTES
Pt is 2 person assist to restroom. Tends to lean backward when ambulating. Neurovascular and peripheral vascular checks are WDL to BLE. Instructed not to ambulate without assistance from staff. Pt verbalized understanding. Call light in reach. Posey chair alarm on pt.

## 2017-06-14 NOTE — PROGRESS NOTES
Problem: Mobility Impaired (Adult and Pediatric)  Goal: *Acute Goals and Plan of Care (Insert Text)  GOALS (1-4 days):  (1.)Ms. Anthony Gordillo will move from supine to sit and sit to supine in bed with CONTACT GUARD ASSIST.   (2.)Ms. Anthony Gordillo will transfer from bed to chair and chair to bed with CONTACT GUARD ASSIST using the least restrictive device. (3.)Ms. Anthony Gordillo will ambulate with CONTACT GUARD ASSIST for 150 feet with the least restrictive device. (4.)Ms. Skaggs will ambulate up/down 4 steps with bilateral railing with MINIMAL ASSIST with no device. (5.)Ms. Skaggs will state/observe EDA precautions with 0 verbal cues. ________________________________________________________________________________________________      PHYSICAL THERAPY JOINT CAMP EDA: Daily Note, AM and PM 6/14/2017  INPATIENT: Hospital Day: 3  [place] / Product Type: Medicare /      NAME/AGE/GENDER: Peter Hernandez is a 80 y.o. female       PRIMARY DIAGNOSIS:  Primary osteoarthritis of right hip [M16.11]              Procedure(s) and Anesthesia Type:     * RIGHT HIP ARTHROPLASTY TOTAL / [place] - Spinal (Right)  ICD-10: Treatment Diagnosis:        · Pain in Right Hip (M25.551)  · Stiffness of Right Hip, Not elsewhere classified (M25.651)  · Difficulty in walking, Not elsewhere classified (R26.2)  · Other abnormalities of gait and mobility (R26.89)       ASSESSMENT:      Ms. Anthony Gordillo presents S/P R EDA. She is a bit lethargic. She presents with decreased R LE strength and ROm,s tandign balance, functional mobility and EDA awareness. She does have some cognitive issues Her spouse is a little tearful and appears overwhelmed. She is pleasantly confused. She walk all the way to the gym without verbal cues for gait sequence. She performs exercises in the gym. This section established at most recent assessment   PROBLEM LIST (Impairments causing functional limitations):  1. Decreased Strength  2.  Decreased Transfer Abilities  3. Decreased Ambulation Ability/Technique  4. Decreased Balance  5. Increased Pain  6. Decreased Activity Tolerance  7. Increased Fatigue  8. Decreased Flexibility/Joint Mobility  9. Decreased Knowledge of Precautions  10. Decreased [place] with Home Exercise Program    INTERVENTIONS PLANNED: (Benefits and precautions of physical therapy have been discussed with the patient.)  1. Balance Exercise  2. Bed Mobility  3. Cold  4. Gait Training  5. Home Exercise Program (HEP)  6. Therapeutic Exercise/Strengthening  7. Transfer Training  8. EDA education  9. Range of Motion: active/assisted/passive  10. Therapeutic Activities  11. Group Therapy      TREATMENT PLAN: Frequency/Duration: Follow patient BID   to address above goals. Rehabilitation Potential For Stated Goals: Good      RECOMMENDED REHABILITATION/EQUIPMENT: (at time of discharge pending progress): Rehab. HISTORY:   History of Present Injury/Illness (Reason for Referral):  S/P R EDA  Past Medical History/Comorbidities:   Ms. Navya Bronson  has a past medical history of Arthritis; Cancer [facility]); CKD (chronic kidney disease); Dyslipidemia; GERD (gastroesophageal reflux disease); UTI (urinary tract infection); Hypertension; LBBB (left bundle branch block); Pacemaker (2009); SSS (sick sinus syndrome) ([address] Status post right hip replacement (6/12/2017); and Thyroid disease. She also has no past medical history of Adverse effect of anesthesia; Aneurysm ([address] Asthma; Autoimmune disease ([address] CAD (coronary artery disease); Chronic obstructive pulmonary disease ([address] Coagulation disorder ([address] Diabetes ([address] Difficult intubation; Endocarditis; Heart failure ([address] Liver disease; Malignant hyperthermia due to anesthesia; Nausea & vomiting; Nicotine vapor product user; Non-nicotine vapor product user; Pseudocholinesterase deficiency; Psychiatric disorder; PUD (peptic ulcer disease); Rheumatic fever; Seizures ([address]  Sleep apnea; Stroke ([facility] [address] or Thromboembolus ([facility] Ms. Sharon Valera  has a past surgical history that includes pacemaker; hysterectomy (01/1974); hernia repair (05/24/2011); cataract removal (Bilateral); breast surgery procedure unlisted; bunionectomy (07/28/2006); knee replacement (Bilateral); thyroidectomy (2009); knee arthroscopy (Bilateral); carpal tunnel release (Left, 2003); heart catheterization (09/2001); and pacemaker placement (07/15/2009). Social History/Living Environment:   Home Environment: Private residence  # Steps to Enter: 2  One/Two Story Residence: Two story  # of Interior Steps: 12  Interior Rails: Left  Living Alone: No  Support Systems: Spouse/Significant Other/Partner  Patient Expects to be Discharged to[de-identified] Private residence  Current DME Used/Available at Home: Lewis Pillow or Shower Type: Tub  Prior Level of Function/Work/Activity:  Functionally independent with ADLs and amb   Number of Personal Factors/Comorbidities that affect the Plan of Care: 0: LOW COMPLEXITY   EXAMINATION:   Most Recent Physical Functioning:                                     Transfers  Sit to Stand: Minimum assistance  Stand to Sit: Minimum assistance                      Weight Bearing Status  Right Side Weight Bearing: As tolerated  Distance (ft): 160 Feet (ft)  Ambulation - Level of Assistance: Minimal assistance  Assistive Device: Walker, rolling  Base of Support: Narrowed  Speed/Janet: Slow  Step Length: Left shortened  Stance: Right decreased  Gait Abnormalities: Antalgic; Step to gait  Interventions: Safety awareness training      Braces/Orthotics:      Left Hip Cold  Type: Cold/ice pack       Body Structures Involved:  1. Bones  2. Joints  3. Muscles Body Functions Affected:  1. Neuromusculoskeletal  2. Movement Related Activities and Participation Affected:  1. Mobility  2.  Self Care   Number of elements that affect the Plan of Care: 4+: HIGH COMPLEXITY   CLINICAL PRESENTATION:   Presentation: Stable and uncomplicated: LOW COMPLEXITY   CLINICAL DECISION MAKIN[address] [place] [id number] AM-PAC 6 Clicks   Basic Mobility Inpatient Short Form  How much difficulty does the patient currently have. .. Unable A Lot A Little None   1. Turning over in bed (including adjusting bedclothes, sheets and blankets)? [ ] 1   [ ] 2   [x ] 3   [ ] 4   2. Sitting down on and standing up from a chair with arms ( e.g., wheelchair, bedside commode, etc.)   [ ] 1   [x ] 2   [ ] 3   [ ] 4   3. Moving from lying on back to sitting on the side of the bed? [ ] 1   [x ] 2   [ ] 3   [ ] 4   How much help from another person does the patient currently need. .. Total A Lot A Little None   4. Moving to and from a bed to a chair (including a wheelchair)? [ ] 1   [x ] 2   [ ] 3   [ ] 4   5. Need to walk in hospital room? [x ] 1   [ ] 2   [ ] 3   [ ] 4   6. Climbing 3-5 steps with a railing? [x ] 1   [ ] 2   [ ] 3   [ ] 4   © 2007, Trustees of [address] [place] [address] [address] [address], under license to [employer]. All rights reserved       Score:  [id number] Most Recent: X (Date: -- )     Interpretation of Tool:  Represents activities that are increasingly more difficult (i.e. Bed mobility, Transfers, Gait). Score 24 23 22-20 19-15 14-10 9-7 6       Modifier CH CI CJ CK CL CM CN        ? Mobility - Walking and Moving Around:     - CURRENT STATUS: CL - 60%-79% impaired, limited or restricted    - GOAL STATUS: CK - 40%-59% impaired, limited or restricted    - D/C STATUS:  ---------------To be determined---------------  [place] / Product Type: Medicare /       Medical Necessity:     · Patient demonstrates fair rehab potential due to higher previous functional level. Reason for Services/Other Comments:  · Patient continues to require present interventions due to patient's inability to perform functional mobility independently.    Use of outcome tool(s) and clinical judgement create a POC that gives a: Clear prediction of patient's progress: LOW COMPLEXITY                 TREATMENT:   (In addition to Assessment/Re-Assessment sessions the following treatments were rendered)      Pre-treatment Symptoms/Complaints:  Feels ok  Pain: Initial:   Pain Intensity 1: 0 (0/10 after therapy)  Post Session:       Gait Training (15 Minutes):  Gait training to improve and/or restore physical functioning as related to mobility. Ambulated 160 Feet (ft) with Minimal assistance using a Walker, rolling and minimal Safety awareness training related to their hip position and motion to promote proper body alignment. Therapeutic Exercise: (45 Minutes (group therapy)):  Exercises per grid below to improve strength. Required minimal verbal cues to promote proper body alignment. Progressed range as indicated. Date:  6/13   Date:  6/14    Date:      ACTIVITY/EXERCISE AM PM AM PM AM PM   GROUP THERAPY  [ ]  [x ]  [x ]  [ ]  [ ]  [ ]   Ankle Pumps  15 15  20          Quad Sets  15  15  20         Gluteal Sets  15  15  20         Hip ABd/ADduction  15  15  20         Straight Leg Raises               Knee Slides  15  15  20         Short Arc Quads  15  15  20         Long Arc Quads    15  20         Chair Slides                               B = bilateral; AA = active assistive; A = active; P = passive       Treatment/Session Assessment:         Response to Treatment:  Became dizzy as we got up. Returned to bed and she felt better. RN informed of situation. .     Education:  [ ] Home Exercises  [x ] Fall Precautions  [x ] Hip Precautions [ ] Going Home Video  [ ] Knee/Hip Prosthesis Review  [x ] Walker Management/Safety [ ] Adaptive Equipment as Needed         Interdisciplinary Collaboration:   · Registered Nurse     After treatment position/precautions:   · Up in chair  · Bed/Chair-wheels locked  · Bed in low position  · Call light within reach  · RN notified  · Family at bedside  · Side rails x 3     Compliance with Program/Exercises:  Will assess as treatment progresses. Recommendations/Intent for next treatment session:  Treatment next visit will focus on increasing Ms. Skaggs's independence with bed mobility, transfers, gait training, strength/ROM exercises, modalities for pain, and patient education.        Total Treatment Duration:  PT Patient Time In/Time Out  Time In: 1030  Time Out: John 18 LATONIA Kumar

## 2017-06-15 LAB
HGB BLD-MCNC: 7.1 G/DL (ref 11.7–15.4)
MM INDURATION POC: NORMAL MM (ref 0–5)
PPD POC: NORMAL NEGATIVE

## 2017-06-15 PROCEDURE — 65270000029 HC RM PRIVATE

## 2017-06-15 PROCEDURE — 74011250637 HC RX REV CODE- 250/637: Performed by: PHYSICIAN ASSISTANT

## 2017-06-15 PROCEDURE — 36415 COLL VENOUS BLD VENIPUNCTURE: CPT | Performed by: PHYSICIAN ASSISTANT

## 2017-06-15 PROCEDURE — 74011250637 HC RX REV CODE- 250/637: Performed by: ORTHOPAEDIC SURGERY

## 2017-06-15 PROCEDURE — 97110 THERAPEUTIC EXERCISES: CPT

## 2017-06-15 PROCEDURE — 97116 GAIT TRAINING THERAPY: CPT

## 2017-06-15 PROCEDURE — P9016 RBC LEUKOCYTES REDUCED: HCPCS | Performed by: PHYSICIAN ASSISTANT

## 2017-06-15 PROCEDURE — 85018 HEMOGLOBIN: CPT | Performed by: PHYSICIAN ASSISTANT

## 2017-06-15 PROCEDURE — 77030013131 HC IV BLD ST ICUM -A

## 2017-06-15 PROCEDURE — 30233N1 TRANSFUSION OF NONAUTOLOGOUS RED BLOOD CELLS INTO PERIPHERAL VEIN, PERCUTANEOUS APPROACH: ICD-10-PCS | Performed by: ORTHOPAEDIC SURGERY

## 2017-06-15 PROCEDURE — 36430 TRANSFUSION BLD/BLD COMPNT: CPT

## 2017-06-15 RX ORDER — SODIUM CHLORIDE 9 MG/ML
250 INJECTION, SOLUTION INTRAVENOUS AS NEEDED
Status: DISCONTINUED | OUTPATIENT
Start: 2017-06-15 | End: 2017-06-16 | Stop reason: HOSPADM

## 2017-06-15 RX ADMIN — LEVOTHYROXINE SODIUM 100 MCG: 100 TABLET ORAL at 05:40

## 2017-06-15 RX ADMIN — ACETAMINOPHEN 1000 MG: 500 TABLET, FILM COATED ORAL at 23:39

## 2017-06-15 RX ADMIN — BISOPROLOL FUMARATE 10 MG: 10 TABLET ORAL at 08:36

## 2017-06-15 RX ADMIN — ACETAMINOPHEN 1000 MG: 500 TABLET, FILM COATED ORAL at 00:48

## 2017-06-15 RX ADMIN — ASPIRIN 325 MG: 325 TABLET, DELAYED RELEASE ORAL at 22:25

## 2017-06-15 RX ADMIN — AMLODIPINE BESYLATE: 10 TABLET ORAL at 08:35

## 2017-06-15 RX ADMIN — ACETAMINOPHEN 1000 MG: 500 TABLET, FILM COATED ORAL at 12:15

## 2017-06-15 RX ADMIN — ACETAMINOPHEN 1000 MG: 500 TABLET, FILM COATED ORAL at 17:28

## 2017-06-15 RX ADMIN — PANTOPRAZOLE SODIUM 40 MG: 40 TABLET, DELAYED RELEASE ORAL at 08:35

## 2017-06-15 RX ADMIN — ACETAMINOPHEN 1000 MG: 500 TABLET, FILM COATED ORAL at 05:40

## 2017-06-15 RX ADMIN — SENNOSIDES AND DOCUSATE SODIUM 2 TABLET: 8.6; 5 TABLET ORAL at 08:35

## 2017-06-15 RX ADMIN — Medication 5 ML: at 23:45

## 2017-06-15 RX ADMIN — Medication 5 ML: at 05:40

## 2017-06-15 RX ADMIN — ASPIRIN 325 MG: 325 TABLET, DELAYED RELEASE ORAL at 08:35

## 2017-06-15 RX ADMIN — DIPHENHYDRAMINE HYDROCHLORIDE 25 MG: 25 CAPSULE ORAL at 22:29

## 2017-06-15 RX ADMIN — FERROUS SULFATE TAB 325 MG (65 MG ELEMENTAL FE) 325 MG: 325 (65 FE) TAB at 08:35

## 2017-06-15 NOTE — PROGRESS NOTES
Gilda 15, 2017         Post Op day: 3 Days Post-OpProcedure(s) (LRB):  RIGHT HIP ARTHROPLASTY TOTAL / Arvin Whitney (Right)      Admit Date: 2017  Admit Diagnosis: Primary osteoarthritis of right hip [M16.11]    LAB:    Recent Results (from the past 24 hour(s))   HEMOGLOBIN    Collection Time: 06/15/17  4:25 AM   Result Value Ref Range    HGB 7.1 (L) 11.7 - 15.4 g/dL     Vital Signs:    Patient Vitals for the past 8 hrs:   BP Temp Pulse Resp SpO2   06/15/17 0735 126/57 98 °F (36.7 °C) 80 16 95 %   06/15/17 0010 119/62 97.4 °F (36.3 °C) 73 16 96 %     Temp (24hrs), Av.2 °F (36.8 °C), Min:97.4 °F (36.3 °C), Max:98.9 °F (37.2 °C)    Body mass index is 20.97 kg/(m^2). Pain Control:   Pain Assessment  Pain Scale 1: Numeric (0 - 10)  Pain Intensity 1: 0  Pain Location 1: Hip  Pain Orientation 1: Right  Pain Description 1: Aching, Constant  Pain Intervention(s) 1: Medication (see MAR)    Subjective: Doing well, No complaints, No SOB, No Chest Pain, No Nausea or Vomitting     Objective: Vital Signs are Stable, No Acute Distress, Alert and Oriented, Dressing is Dry,  Neurovascular exam is normal.       PT/OT:            Assistive Device: Walker (comment)  RLE AROM  R Hip Flexion: 70  R Hip ABduction: 15             Weight Bearing Status: WBAT    Meds:  [unfilled]  [unfilled]  [unfilled]    Assessment:   Patient Active Problem List   Diagnosis Code    Status post right hip replacement Z96.641             Plan: Continue Physical Therapy, Transfuse 2 units of blood.         Signed By: Geovany Davison MD

## 2017-06-15 NOTE — PROGRESS NOTES
Problem: Mobility Impaired (Adult and Pediatric)  Goal: *Acute Goals and Plan of Care (Insert Text)  GOALS (1-4 days):  (1.)Ms. Temo Corrales will move from supine to sit and sit to supine in bed with CONTACT GUARD ASSIST. 6/15  (2.)Ms. Temo Corrales will transfer from bed to chair and chair to bed with CONTACT GUARD ASSIST using the least restrictive device. 6/15  (3.)Ms. Temo Corrales will ambulate with CONTACT GUARD ASSIST for 150 feet with the least restrictive device. (4.)Ms. Skaggs will ambulate up/down 4 steps with bilateral railing with MINIMAL ASSIST with no device. Going to rehab  (5.)Ms. Skaggs will state/observe EDA precautions with 0 verbal cues. ________________________________________________________________________________________________      PHYSICAL THERAPY JOINT CAMP EDA: Daily Note and PM 6/15/2017  INPATIENT: Hospital Day: 4  [place] / Product Type: Medicare /      NAME/AGE/GENDER: Dawson Basilio is a 80 y.o. female       PRIMARY DIAGNOSIS:  Primary osteoarthritis of right hip [M16.11]              Procedure(s) and Anesthesia Type:     * RIGHT HIP ARTHROPLASTY TOTAL / Gustavo Seats - Spinal (Right)  ICD-10: Treatment Diagnosis:        · Pain in Right Hip (M25.551)  · Stiffness of Right Hip, Not elsewhere classified (M25.651)  · Difficulty in walking, Not elsewhere classified (R26.2)  · Other abnormalities of gait and mobility (R26.89)       ASSESSMENT:      Ms. Temo Corrales presents S/P R EDA. She is a bit lethargic. She presents with decreased R LE strength and ROm,s tandign balance, functional mobility and EDA awareness. She does have some cognitive issues Her spouse is a little tearful and appears overwhelmed. She is making good progress with gait and exercises. She is getting some blood and will leave tomorrow for rehab. This section established at most recent assessment   PROBLEM LIST (Impairments causing functional limitations):  1. Decreased Strength  2.  Decreased Transfer Abilities  3. Decreased Ambulation Ability/Technique  4. Decreased Balance  5. Increased Pain  6. Decreased Activity Tolerance  7. Increased Fatigue  8. Decreased Flexibility/Joint Mobility  9. Decreased Knowledge of Precautions  10. Decreased [place] with Home Exercise Program    INTERVENTIONS PLANNED: (Benefits and precautions of physical therapy have been discussed with the patient.)  1. Balance Exercise  2. Bed Mobility  3. Cold  4. Gait Training  5. Home Exercise Program (HEP)  6. Therapeutic Exercise/Strengthening  7. Transfer Training  8. EDA education  9. Range of Motion: active/assisted/passive  10. Therapeutic Activities  11. Group Therapy      TREATMENT PLAN: Frequency/Duration: Follow patient BID   to address above goals. Rehabilitation Potential For Stated Goals: Good      RECOMMENDED REHABILITATION/EQUIPMENT: (at time of discharge pending progress): Rehab. HISTORY:   History of Present Injury/Illness (Reason for Referral):  S/P R EDA  Past Medical History/Comorbidities:   Ms. Gill Parham  has a past medical history of Arthritis; Cancer [facility]); CKD (chronic kidney disease); Dyslipidemia; GERD (gastroesophageal reflux disease); UTI (urinary tract infection); Hypertension; LBBB (left bundle branch block); Pacemaker (2009); SSS (sick sinus syndrome) ([address] Status post right hip replacement (6/12/2017); and Thyroid disease. She also has no past medical history of Adverse effect of anesthesia; Aneurysm ([address] Asthma; Autoimmune disease ([address] CAD (coronary artery disease); Chronic obstructive pulmonary disease ([address] Coagulation disorder ([address] Diabetes ([address] Difficult intubation; Endocarditis; Heart failure ([address] Liver disease; Malignant hyperthermia due to anesthesia; Nausea & vomiting; Nicotine vapor product user; Non-nicotine vapor product user; Pseudocholinesterase deficiency; Psychiatric disorder; PUD (peptic ulcer disease); Rheumatic fever; Seizures ([address]  Sleep apnea; Stroke ([facility] or Thromboembolus ([facility] Ms. Leila Jimenez  has a past surgical history that includes pacemaker; hysterectomy (01/1974); hernia repair (05/24/2011); cataract removal (Bilateral); breast surgery procedure unlisted; bunionectomy (07/28/2006); knee replacement (Bilateral); thyroidectomy (2009); knee arthroscopy (Bilateral); carpal tunnel release (Left, 2003); heart catheterization (09/2001); and pacemaker placement (07/15/2009). Social History/Living Environment:   Home Environment: Private residence  # Steps to Enter: 2  One/Two Story Residence: Two story  # of Interior Steps: 12  Interior Rails: Left  Living Alone: No  Support Systems: Spouse/Significant Other/Partner  Patient Expects to be Discharged to[de-identified] Private residence  Current DME Used/Available at Home: Edgar Pat or Shower Type: Tub  Prior Level of Function/Work/Activity:  Functionally independent with ADLs and amb   Number of Personal Factors/Comorbidities that affect the Plan of Care: 0: LOW COMPLEXITY   EXAMINATION:   Most Recent Physical Functioning:                                     Transfers  Sit to Stand: Contact guard assistance  Stand to Sit: Contact guard assistance                      Weight Bearing Status  Right Side Weight Bearing: As tolerated  Distance (ft): 80 Feet (ft)  Ambulation - Level of Assistance: Contact guard assistance  Assistive Device: Walker, rolling  Base of Support: Narrowed  Speed/Janet: Slow  Step Length: Left shortened  Stance: Right decreased  Gait Abnormalities: Antalgic; Step to gait  Interventions: Safety awareness training      Braces/Orthotics:      Left Hip Cold  Type: Cold/ice pack       Body Structures Involved:  1. Bones  2. Joints  3. Muscles Body Functions Affected:  1. Neuromusculoskeletal  2. Movement Related Activities and Participation Affected:  1. Mobility  2.  Self Care   Number of elements that affect the Plan of Care: 4+: HIGH COMPLEXITY   CLINICAL PRESENTATION:   Presentation: Stable and uncomplicated: LOW COMPLEXITY   CLINICAL DECISION MAKIN[address] [place] [id number] AM-PAC 6 Clicks   Basic Mobility Inpatient Short Form  How much difficulty does the patient currently have. .. Unable A Lot A Little None   1. Turning over in bed (including adjusting bedclothes, sheets and blankets)? [ ] 1   [ ] 2   [x ] 3   [ ] 4   2. Sitting down on and standing up from a chair with arms ( e.g., wheelchair, bedside commode, etc.)   [ ] 1   [x ] 2   [ ] 3   [ ] 4   3. Moving from lying on back to sitting on the side of the bed? [ ] 1   [x ] 2   [ ] 3   [ ] 4   How much help from another person does the patient currently need. .. Total A Lot A Little None   4. Moving to and from a bed to a chair (including a wheelchair)? [ ] 1   [x ] 2   [ ] 3   [ ] 4   5. Need to walk in hospital room? [x ] 1   [ ] 2   [ ] 3   [ ] 4   6. Climbing 3-5 steps with a railing? [x ] 1   [ ] 2   [ ] 3   [ ] 4   © 2007, Trustees of [address] [place] [address] [address] [address], under license to [employer]. All rights reserved       Score:  [id number] Most Recent: X (Date: -- )     Interpretation of Tool:  Represents activities that are increasingly more difficult (i.e. Bed mobility, Transfers, Gait). Score 24 23 22-20 19-15 14-10 9-7 6       Modifier CH CI CJ CK CL CM CN        ? Mobility - Walking and Moving Around:     - CURRENT STATUS: CL - 60%-79% impaired, limited or restricted    - GOAL STATUS: CK - 40%-59% impaired, limited or restricted    - D/C STATUS:  ---------------To be determined---------------  [place] / Product Type: Medicare /       Medical Necessity:     · Patient demonstrates fair rehab potential due to higher previous functional level. Reason for Services/Other Comments:  · Patient continues to require present interventions due to patient's inability to perform functional mobility independently.    Use of outcome tool(s) and clinical judgement create a POC that gives a: Clear prediction of patient's progress: LOW COMPLEXITY                 TREATMENT:   (In addition to Assessment/Re-Assessment sessions the following treatments were rendered)      Pre-treatment Symptoms/Complaints:  Feels ok  Pain: Initial:   Pain Intensity 1: 0 (0/10 after therapy)  Post Session:       Gait Training (15 Minutes):  Gait training to improve and/or restore physical functioning as related to mobility. Ambulated 80 Feet (ft) with Contact guard assistance using a Walker, rolling and minimal Safety awareness training related to their hip position and motion to promote proper body alignment. Therapeutic Exercise: (15 Minutes):  Exercises per grid below to improve strength. Required minimal verbal cues to promote proper body alignment. Progressed range as indicated. Date:  6/13   Date:  6/14    Date:  6/15      ACTIVITY/EXERCISE AM PM AM PM AM PM   GROUP THERAPY  [ ]  [x ]  [x ]  [ ]  [ ]  [ ]   Ankle Pumps  15 15  20   20  20  20   Quad Sets  15  15  20  20  20  20   Gluteal Sets  15  15  20  20  20  20   Hip ABd/ADduction  15  15  20  20  20  20   Straight Leg Raises               Knee Slides  15  15  20  20  20  20   Short Arc Quads  15  15  20  20  20  20   Long Arc Quads    15  20  20  20  20   Chair Slides                               B = bilateral; AA = active assistive; A = active; P = passive       Treatment/Session Assessment:         Response to Treatment:   Pt tolerated exercises and gait well. Education:  [ ] Home Exercises  [x ] Fall Precautions  [x ] Hip Precautions [ ] Going Home Video  [ ] Knee/Hip Prosthesis Review  [x ] Walker Management/Safety [ ] Adaptive Equipment as Needed         Interdisciplinary Collaboration:   · Registered Nurse     After treatment position/precautions:   · Up in chair  · Bed/Chair-wheels locked  · Bed in low position  · Call light within reach  · RN notified  · Family at bedside  · Side rails x 3     Compliance with Program/Exercises:  Will assess as treatment progresses. Recommendations/Intent for next treatment session:  Treatment next visit will focus on increasing Ms. Skaggs's independence with bed mobility, transfers, gait training, strength/ROM exercises, modalities for pain, and patient education.        Total Treatment Duration:  PT Patient Time In/Time Out  Time In: 1230  Time Out: 6318 [facility]

## 2017-06-15 NOTE — PROGRESS NOTES
Problem: Mobility Impaired (Adult and Pediatric)  Goal: *Acute Goals and Plan of Care (Insert Text)  GOALS (1-4 days):  (1.)Ms. Chuyita Alonso will move from supine to sit and sit to supine in bed with CONTACT GUARD ASSIST. 6/15  (2.)Ms. Chuyita Alonso will transfer from bed to chair and chair to bed with CONTACT GUARD ASSIST using the least restrictive device. 6/15  (3.)Ms. Chuyita Alonso will ambulate with CONTACT GUARD ASSIST for 150 feet with the least restrictive device. (4.)Ms. Skaggs will ambulate up/down 4 steps with bilateral railing with MINIMAL ASSIST with no device. Going to rehab  (5.)Ms. Skaggs will state/observe EDA precautions with 0 verbal cues. ________________________________________________________________________________________________      PHYSICAL THERAPY JOINT CAMP EDA: Daily Note and AM 6/15/2017  INPATIENT: Hospital Day: 4  [place] / Product Type: Medicare /      NAME/AGE/GENDER: Carlos Harris is a 80 y.o. female       PRIMARY DIAGNOSIS:  Primary osteoarthritis of right hip [M16.11]              Procedure(s) and Anesthesia Type:     * RIGHT HIP ARTHROPLASTY TOTAL / Prieto Crape - Spinal (Right)  ICD-10: Treatment Diagnosis:        · Pain in Right Hip (M25.551)  · Stiffness of Right Hip, Not elsewhere classified (M25.651)  · Difficulty in walking, Not elsewhere classified (R26.2)  · Other abnormalities of gait and mobility (R26.89)       ASSESSMENT:      Ms. Chuyita Alonso presents S/P R EDA. She is a bit lethargic. She presents with decreased R LE strength and ROm,s tandign balance, functional mobility and EDA awareness. She does have some cognitive issues Her spouse is a little tearful and appears overwhelmed. She is doing well this morning. She performs exercises in the bed without any problems. She ambulates 60 ft using RW with CGA. No verbal cues with gait. She is sitting in the chair eating breakfast with alarm on.    This section established at most recent assessment   PROBLEM LIST (Impairments causing functional limitations):  1. Decreased Strength  2. Decreased Transfer Abilities  3. Decreased Ambulation Ability/Technique  4. Decreased Balance  5. Increased Pain  6. Decreased Activity Tolerance  7. Increased Fatigue  8. Decreased Flexibility/Joint Mobility  9. Decreased Knowledge of Precautions  10. Decreased [place] with Home Exercise Program    INTERVENTIONS PLANNED: (Benefits and precautions of physical therapy have been discussed with the patient.)  1. Balance Exercise  2. Bed Mobility  3. Cold  4. Gait Training  5. Home Exercise Program (HEP)  6. Therapeutic Exercise/Strengthening  7. Transfer Training  8. EDA education  9. Range of Motion: active/assisted/passive  10. Therapeutic Activities  11. Group Therapy      TREATMENT PLAN: Frequency/Duration: Follow patient BID   to address above goals. Rehabilitation Potential For Stated Goals: Good      RECOMMENDED REHABILITATION/EQUIPMENT: (at time of discharge pending progress): Rehab. HISTORY:   History of Present Injury/Illness (Reason for Referral):  S/P R EDA  Past Medical History/Comorbidities:   Ms. Danny De Luna  has a past medical history of Arthritis; Cancer [facility]); CKD (chronic kidney disease); Dyslipidemia; GERD (gastroesophageal reflux disease); UTI (urinary tract infection); Hypertension; LBBB (left bundle branch block); Pacemaker (2009); SSS (sick sinus syndrome) ([address] Status post right hip replacement (6/12/2017); and Thyroid disease. She also has no past medical history of Adverse effect of anesthesia; Aneurysm ([address] Asthma; Autoimmune disease ([address] CAD (coronary artery disease); Chronic obstructive pulmonary disease ([address] Coagulation disorder ([address] Diabetes ([address] Difficult intubation; Endocarditis; Heart failure ([address] Liver disease; Malignant hyperthermia due to anesthesia;  Nausea & vomiting; Nicotine vapor product user; Non-nicotine vapor product user; Pseudocholinesterase deficiency; Psychiatric disorder; PUD (peptic ulcer disease); Rheumatic fever; Seizures ([facility] Sleep apnea; Stroke [facility]); or Thromboembolus ([facility] Ms. Destiney Casey  has a past surgical history that includes pacemaker; hysterectomy (01/1974); hernia repair (05/24/2011); cataract removal (Bilateral); breast surgery procedure unlisted; bunionectomy (07/28/2006); knee replacement (Bilateral); thyroidectomy (2009); knee arthroscopy (Bilateral); carpal tunnel release (Left, 2003); heart catheterization (09/2001); and pacemaker placement (07/15/2009). Social History/Living Environment:   Home Environment: Private residence  # Steps to Enter: 2  One/Two Story Residence: Two story  # of Interior Steps: 12  Interior Rails: Left  Living Alone: No  Support Systems: Spouse/Significant Other/Partner  Patient Expects to be Discharged to[de-identified] Private residence  Current DME Used/Available at Home: [address] or Shower Type: Tub  Prior Level of Function/Work/Activity:  Functionally independent with ADLs and amb   Number of Personal Factors/Comorbidities that affect the Plan of Care: 0: LOW COMPLEXITY   EXAMINATION:   Most Recent Physical Functioning:                                     Transfers  Sit to Stand: Contact guard assistance  Stand to Sit: Contact guard assistance                      Weight Bearing Status  Right Side Weight Bearing: As tolerated  Distance (ft): 60 Feet (ft)  Ambulation - Level of Assistance: Contact guard assistance  Assistive Device: Walker, rolling  Base of Support: Narrowed  Speed/Janet: Slow  Step Length: Left shortened  Stance: Right decreased  Gait Abnormalities: Antalgic; Step to gait  Interventions: Safety awareness training      Braces/Orthotics:      Left Hip Cold  Type: Cold/ice pack       Body Structures Involved:  1. Bones  2. Joints  3. Muscles Body Functions Affected:  1. Neuromusculoskeletal  2. Movement Related Activities and Participation Affected:  1. Mobility  2.  Self Care   Number of elements that affect the Plan of Care: 4+: HIGH COMPLEXITY   CLINICAL PRESENTATION:   Presentation: Stable and uncomplicated: LOW COMPLEXITY   CLINICAL DECISION MAKIN [facility] [address] [address] AM-PAC 6 Clicks   Basic Mobility Inpatient Short Form  How much difficulty does the patient currently have. .. Unable A Lot A Little None   1. Turning over in bed (including adjusting bedclothes, sheets and blankets)? [ ] 1   [ ] 2   [x ] 3   [ ] 4   2. Sitting down on and standing up from a chair with arms ( e.g., wheelchair, bedside commode, etc.)   [ ] 1   [x ] 2   [ ] 3   [ ] 4   3. Moving from lying on back to sitting on the side of the bed? [ ] 1   [x ] 2   [ ] 3   [ ] 4   How much help from another person does the patient currently need. .. Total A Lot A Little None   4. Moving to and from a bed to a chair (including a wheelchair)? [ ] 1   [x ] 2   [ ] 3   [ ] 4   5. Need to walk in hospital room? [x ] 1   [ ] 2   [ ] 3   [ ] 4   6. Climbing 3-5 steps with a railing? [x ] 1   [ ] 2   [ ] 3   [ ] 4   © 2007, Trustees of [address] [place] [address] [address] [address], under license to [employer]. All rights reserved       Score:  [id number] Most Recent: X (Date: -- )     Interpretation of Tool:  Represents activities that are increasingly more difficult (i.e. Bed mobility, Transfers, Gait). Score 24 23 22-20 19-15 14-10 9-7 6       Modifier CH CI CJ CK CL CM CN        ? Mobility - Walking and Moving Around:     - CURRENT STATUS: CL - 60%-79% impaired, limited or restricted    - GOAL STATUS: CK - 40%-59% impaired, limited or restricted    - D/C STATUS:  ---------------To be determined---------------  [place] / Product Type: Medicare /       Medical Necessity:     · Patient demonstrates fair rehab potential due to higher previous functional level.   Reason for Services/Other Comments:  · Patient continues to require present interventions due to patient's inability to perform functional mobility independently. Use of outcome tool(s) and clinical judgement create a POC that gives a: Clear prediction of patient's progress: LOW COMPLEXITY                 TREATMENT:   (In addition to Assessment/Re-Assessment sessions the following treatments were rendered)      Pre-treatment Symptoms/Complaints:  Feels ok  Pain: Initial:   Pain Intensity 1: 0 (0/10 after therapy)  Post Session:       Gait Training (15 Minutes):  Gait training to improve and/or restore physical functioning as related to mobility. Ambulated 60 Feet (ft) with Contact guard assistance using a Walker, rolling and minimal Safety awareness training related to their hip position and motion to promote proper body alignment. Therapeutic Exercise: (15 Minutes):  Exercises per grid below to improve strength. Required minimal verbal cues to promote proper body alignment. Progressed range as indicated. Date:  6/13   Date:  6/14    Date:  6/15      ACTIVITY/EXERCISE AM PM AM PM AM PM   GROUP THERAPY  [ ]  [x ]  [x ]  [ ]  [ ]  [ ]   Ankle Pumps  15 15  20   20  20     Quad Sets  15  15  20  20  20     Gluteal Sets  15  15  20  20  20     Hip ABd/ADduction  15  15  20  20  20     Straight Leg Raises               Knee Slides  15  15  20  20  20     Short Arc Quads  15  15  20  20  20     Long Arc Quads    15  20  20  20     Chair Slides                               B = bilateral; AA = active assistive; A = active; P = passive       Treatment/Session Assessment:         Response to Treatment:   She tolerated gait and exercises well.      Education:  [ ] Home Exercises  [x ] Fall Precautions  [x ] Hip Precautions [ ] Going Home Video  [ ] Knee/Hip Prosthesis Review  [x ] Walker Management/Safety [ ] Adaptive Equipment as Needed         Interdisciplinary Collaboration:   · Registered Nurse     After treatment position/precautions:   · Up in chair  · Bed/Chair-wheels locked  · Bed in low position  · Call light within reach  · RN notified  · Family at bedside  · Side rails x 3     Compliance with Program/Exercises: Will assess as treatment progresses. Recommendations/Intent for next treatment session:  Treatment next visit will focus on increasing Ms. Skaggs's independence with bed mobility, transfers, gait training, strength/ROM exercises, modalities for pain, and patient education.        Total Treatment Duration:  PT Patient Time In/Time Out  Time In: 0778  Time Out: 8682     Chelita Kumar, PTA

## 2017-06-16 VITALS
HEIGHT: 65 IN | HEART RATE: 71 BPM | SYSTOLIC BLOOD PRESSURE: 149 MMHG | WEIGHT: 126 LBS | BODY MASS INDEX: 20.99 KG/M2 | OXYGEN SATURATION: 97 % | DIASTOLIC BLOOD PRESSURE: 65 MMHG | TEMPERATURE: 98 F | RESPIRATION RATE: 16 BRPM

## 2017-06-16 PROBLEM — D64.9 POSTOPERATIVE ANEMIA: Status: ACTIVE | Noted: 2017-06-16

## 2017-06-16 LAB
ABO + RH BLD: NORMAL
BLD PROD TYP BPU: NORMAL
BLD PROD TYP BPU: NORMAL
BLOOD GROUP ANTIBODIES SERPL: NORMAL
BPU ID: NORMAL
BPU ID: NORMAL
CROSSMATCH RESULT,%XM: NORMAL
CROSSMATCH RESULT,%XM: NORMAL
HGB BLD-MCNC: 13 G/DL (ref 11.7–15.4)
MM INDURATION POC: NORMAL MM (ref 0–5)
PPD POC: NORMAL NEGATIVE
SPECIMEN EXP DATE BLD: NORMAL
STATUS OF UNIT,%ST: NORMAL
STATUS OF UNIT,%ST: NORMAL
UNIT DIVISION, %UDIV: 0
UNIT DIVISION, %UDIV: 0

## 2017-06-16 PROCEDURE — 36430 TRANSFUSION BLD/BLD COMPNT: CPT

## 2017-06-16 PROCEDURE — 97150 GROUP THERAPEUTIC PROCEDURES: CPT

## 2017-06-16 PROCEDURE — 74011250637 HC RX REV CODE- 250/637: Performed by: ORTHOPAEDIC SURGERY

## 2017-06-16 PROCEDURE — 85018 HEMOGLOBIN: CPT | Performed by: ORTHOPAEDIC SURGERY

## 2017-06-16 PROCEDURE — 36415 COLL VENOUS BLD VENIPUNCTURE: CPT | Performed by: ORTHOPAEDIC SURGERY

## 2017-06-16 PROCEDURE — 74011250637 HC RX REV CODE- 250/637: Performed by: PHYSICIAN ASSISTANT

## 2017-06-16 PROCEDURE — 97116 GAIT TRAINING THERAPY: CPT

## 2017-06-16 RX ADMIN — AMLODIPINE BESYLATE: 10 TABLET ORAL at 09:48

## 2017-06-16 RX ADMIN — SENNOSIDES AND DOCUSATE SODIUM 2 TABLET: 8.6; 5 TABLET ORAL at 09:48

## 2017-06-16 RX ADMIN — PANTOPRAZOLE SODIUM 40 MG: 40 TABLET, DELAYED RELEASE ORAL at 05:07

## 2017-06-16 RX ADMIN — ACETAMINOPHEN 1000 MG: 500 TABLET, FILM COATED ORAL at 12:00

## 2017-06-16 RX ADMIN — ACETAMINOPHEN 1000 MG: 500 TABLET, FILM COATED ORAL at 05:07

## 2017-06-16 RX ADMIN — BISOPROLOL FUMARATE 10 MG: 10 TABLET ORAL at 09:00

## 2017-06-16 RX ADMIN — FERROUS SULFATE TAB 325 MG (65 MG ELEMENTAL FE) 325 MG: 325 (65 FE) TAB at 09:48

## 2017-06-16 RX ADMIN — ASPIRIN 325 MG: 325 TABLET, DELAYED RELEASE ORAL at 09:48

## 2017-06-16 RX ADMIN — LEVOTHYROXINE SODIUM 100 MCG: 100 TABLET ORAL at 05:07

## 2017-06-16 RX ADMIN — Medication 5 ML: at 05:13

## 2017-06-16 NOTE — PROGRESS NOTES
Pt report called to [facility] and spoke with geoffrey Hankins. Pt left hospital via EMS transport. Pt assessment unchanged.

## 2017-06-16 NOTE — PROGRESS NOTES
2017         Post Op day: 4 Days Post-Op Procedure(s) (LRB):  RIGHT HIP ARTHROPLASTY TOTAL / Bobetta Lapping (Right)      Admit Date: 2017  Admit Diagnosis: Primary osteoarthritis of right hip [M16.11]       Principle Problem: Status post right hip replacement. Subjective: Doing well, No complaints, No SOB, No Chest Pain, No Nausea or Vomiting     Objective:   Vital Signs are Stable, No Acute Distress, Alert and Oriented, Dressing is Dry,  Neurovascular exam is normal.     Assessment / Plan :  Patient Active Problem List   Diagnosis Code    Status post right hip replacement Z96.641    Postoperative anemia D64.9    Patient Vitals for the past 8 hrs:   BP Temp Pulse Resp SpO2   17 0507 140/72 97.7 °F (36.5 °C) 71 14 (!) 75 %   17 0235 126/67 98 °F (36.7 °C) 73 14 94 %   17 0141 142/69 98.1 °F (36.7 °C) 69 14 95 %   17 0022 126/67 97.7 °F (36.5 °C) 74 14 94 %   17 0010 140/63 98.6 °F (37 °C) 73 14 94 %   06/15/17 2320 140/63 98.3 °F (36.8 °C) 73 - 96 %    Temp (24hrs), Av.9 °F (36.6 °C), Min:97.1 °F (36.2 °C), Max:98.6 °F (37 °C)    Body mass index is 20.97 kg/(m^2).     Lab Results   Component Value Date/Time    HGB 7.1 06/15/2017 04:25 AM      Pt seen by and discussed with Supervising Physician   Continue PT  Postop anemia: recheck hgb       Signed By: KAYLA Escalona  2017,  7:19 AM

## 2017-06-16 NOTE — PROGRESS NOTES
Problem: Mobility Impaired (Adult and Pediatric)  Goal: *Acute Goals and Plan of Care (Insert Text)  GOALS (1-4 days):  (1.)Ms. Pamela Saez will move from supine to sit and sit to supine in bed with CONTACT GUARD ASSIST. 6/15  (2.)Ms. Pamela Saez will transfer from bed to chair and chair to bed with CONTACT GUARD ASSIST using the least restrictive device. 6/15  (3.)Ms. Pamela Saez will ambulate with CONTACT GUARD ASSIST for 150 feet with the least restrictive device. (4.)Ms. Skaggs will ambulate up/down 4 steps with bilateral railing with MINIMAL ASSIST with no device. Going to rehab  (5.)Ms. Skaggs will state/observe EDA precautions with 0 verbal cues. ________________________________________________________________________________________________      PHYSICAL THERAPY JOINT CAMP EDA: Daily Note and AM 6/16/2017  INPATIENT: Hospital Day: 5  [place] / Product Type: Medicare /      NAME/AGE/GENDER: Valentin Cortes is a 80 y.o. female       PRIMARY DIAGNOSIS:  Primary osteoarthritis of right hip [M16.11]              Procedure(s) and Anesthesia Type:     * RIGHT HIP ARTHROPLASTY TOTAL / Freddrick Reagin - Spinal (Right)  ICD-10: Treatment Diagnosis:        · Pain in Right Hip (M25.551)  · Stiffness of Right Hip, Not elsewhere classified (M25.651)  · Difficulty in walking, Not elsewhere classified (R26.2)  · Other abnormalities of gait and mobility (R26.89)       ASSESSMENT:      Ms. Pamela Saez presents S/P R EDA. She is a bit lethargic. She presents with decreased R LE strength and ROm,s tandign balance, functional mobility and EDA awareness. She does have some cognitive issues Her spouse is a little tearful and appears overwhelmed. She is making good progress with gait and exercises. This section established at most recent assessment   PROBLEM LIST (Impairments causing functional limitations):  1. Decreased Strength  2. Decreased Transfer Abilities  3. Decreased Ambulation Ability/Technique  4.  Decreased Balance  5. Increased Pain  6. Decreased Activity Tolerance  7. Increased Fatigue  8. Decreased Flexibility/Joint Mobility  9. Decreased Knowledge of Precautions  10. Decreased [place] with Home Exercise Program    INTERVENTIONS PLANNED: (Benefits and precautions of physical therapy have been discussed with the patient.)  1. Balance Exercise  2. Bed Mobility  3. Cold  4. Gait Training  5. Home Exercise Program (HEP)  6. Therapeutic Exercise/Strengthening  7. Transfer Training  8. EDA education  9. Range of Motion: active/assisted/passive  10. Therapeutic Activities  11. Group Therapy      TREATMENT PLAN: Frequency/Duration: Follow patient BID   to address above goals. Rehabilitation Potential For Stated Goals: Good      RECOMMENDED REHABILITATION/EQUIPMENT: (at time of discharge pending progress): Rehab. HISTORY:   History of Present Injury/Illness (Reason for Referral):  S/P R EDA  Past Medical History/Comorbidities:   Ms. Blake Contreras  has a past medical history of Arthritis; Cancer [facility]); CKD (chronic kidney disease); Dyslipidemia; GERD (gastroesophageal reflux disease); UTI (urinary tract infection); Hypertension; LBBB (left bundle branch block); Pacemaker (2009); SSS (sick sinus syndrome) ([address] Status post right hip replacement (6/12/2017); and Thyroid disease. She also has no past medical history of Adverse effect of anesthesia; Aneurysm ([address] Asthma; Autoimmune disease ([address] CAD (coronary artery disease); Chronic obstructive pulmonary disease ([address] Coagulation disorder ([address] Diabetes ([address] Difficult intubation; Endocarditis; Heart failure ([address] Liver disease; Malignant hyperthermia due to anesthesia; Nausea & vomiting; Nicotine vapor product user; Non-nicotine vapor product user; Pseudocholinesterase deficiency; Psychiatric disorder; PUD (peptic ulcer disease); Rheumatic fever; Seizures ([address] Sleep apnea; Stroke [facility]); or Thromboembolus ([address]   Ms. Blake Contreras  has a past surgical history that includes pacemaker; hysterectomy (1974); hernia repair (2011); cataract removal (Bilateral); breast surgery procedure unlisted; bunionectomy (2006); knee replacement (Bilateral); thyroidectomy (); knee arthroscopy (Bilateral); carpal tunnel release (Left, ); heart catheterization (2001); and pacemaker placement (07/15/2009). Social History/Living Environment:   Home Environment: Private residence  # Steps to Enter: 2  One/Two Story Residence: Two story  # of Interior Steps: 12  Interior Rails: Left  Living Alone: No  Support Systems: Spouse/Significant Other/Partner  Patient Expects to be Discharged to[de-identified] Private residence  Current DME Used/Available at Home: Daune Rued or Shower Type: Tub  Prior Level of Function/Work/Activity:  Functionally independent with ADLs and amb   Number of Personal Factors/Comorbidities that affect the Plan of Care: 0: LOW COMPLEXITY   EXAMINATION:   Most Recent Physical Functioning:                                     Transfers  Sit to Stand: Contact guard assistance  Stand to Sit: Contact guard assistance                      Weight Bearing Status  Right Side Weight Bearing: As tolerated  Distance (ft): 160 Feet (ft)  Ambulation - Level of Assistance: Contact guard assistance  Assistive Device: Walker, rolling  Base of Support: Narrowed  Speed/Janet: Slow  Step Length: Left shortened  Stance: Right decreased  Gait Abnormalities: Antalgic; Step to gait  Interventions: Safety awareness training      Braces/Orthotics:      Left Hip Cold  Type: Cold/ice pack       Body Structures Involved:  1. Bones  2. Joints  3. Muscles Body Functions Affected:  1. Neuromusculoskeletal  2. Movement Related Activities and Participation Affected:  1. Mobility  2.  Self Care   Number of elements that affect the Plan of Care: 4+: HIGH COMPLEXITY   CLINICAL PRESENTATION:   Presentation: Stable and uncomplicated: LOW COMPLEXITY   CLINICAL DECISION MAKIN [facility] [address] [id number] AM-PAC 6 Clicks   Basic Mobility Inpatient Short Form  How much difficulty does the patient currently have. .. Unable A Lot A Little None   1. Turning over in bed (including adjusting bedclothes, sheets and blankets)? [ ] 1   [ ] 2   [x ] 3   [ ] 4   2. Sitting down on and standing up from a chair with arms ( e.g., wheelchair, bedside commode, etc.)   [ ] 1   [x ] 2   [ ] 3   [ ] 4   3. Moving from lying on back to sitting on the side of the bed? [ ] 1   [x ] 2   [ ] 3   [ ] 4   How much help from another person does the patient currently need. .. Total A Lot A Little None   4. Moving to and from a bed to a chair (including a wheelchair)? [ ] 1   [x ] 2   [ ] 3   [ ] 4   5. Need to walk in hospital room? [x ] 1   [ ] 2   [ ] 3   [ ] 4   6. Climbing 3-5 steps with a railing? [x ] 1   [ ] 2   [ ] 3   [ ] 4   © 2007, Trustees of [address] [place] [address] [address] [id number], under license to [employer]. All rights reserved       Score:  [id number] Most Recent: X (Date: -- )     Interpretation of Tool:  Represents activities that are increasingly more difficult (i.e. Bed mobility, Transfers, Gait). Score 24 23 22-20 19-15 14-10 9-7 6       Modifier CH CI CJ CK CL CM CN        ? Mobility - Walking and Moving Around:     - CURRENT STATUS: CL - 60%-79% impaired, limited or restricted    - GOAL STATUS: CK - 40%-59% impaired, limited or restricted    - D/C STATUS:  ---------------To be determined---------------  [place] / Product Type: Medicare /       Medical Necessity:     · Patient demonstrates fair rehab potential due to higher previous functional level. Reason for Services/Other Comments:  · Patient continues to require present interventions due to patient's inability to perform functional mobility independently.    Use of outcome tool(s) and clinical judgement create a POC that gives a: Clear prediction of patient's progress: LOW COMPLEXITY TREATMENT:   (In addition to Assessment/Re-Assessment sessions the following treatments were rendered)      Pre-treatment Symptoms/Complaints:  Feels ok  Pain: Initial:   Pain Intensity 1: 0 (0/10 after therapy)  Post Session:       Gait Training (15 Minutes):  Gait training to improve and/or restore physical functioning as related to mobility. Ambulated 160 Feet (ft) with Contact guard assistance using a Walker, rolling and minimal Safety awareness training related to their hip position and motion to promote proper body alignment. Therapeutic Exercise: (30 Minutes (group therapy)):  Exercises per grid below to improve strength. Required minimal verbal cues to promote proper body alignment. Progressed range as indicated. Date:  6/13   Date:  6/14    Date:  6/16      ACTIVITY/EXERCISE AM PM AM PM AM PM   GROUP THERAPY  [ ]  [x ]  [x ]  [ ]  [x]  [ ]   Ankle Pumps  15 15  20   20  25    Quad Sets  15  15  20  20  25    Gluteal Sets  15  15  20  20  25    Hip ABd/ADduction  15  15  20  20  25    Straight Leg Raises              Knee Slides  15  15  20  20  25    Short Arc Quads  15  15  20  20  25    Long Arc Quads    15  20  20  25    Chair Slides                               B = bilateral; AA = active assistive; A = active; P = passive       Treatment/Session Assessment:         Response to Treatment:   Pt tolerated exercises and gait well. Education:  [ ] Home Exercises  [x ] Fall Precautions  [x ] Hip Precautions [ ] Going Home Video  [ ] Knee/Hip Prosthesis Review  [x ] Walker Management/Safety [ ] Adaptive Equipment as Needed         Interdisciplinary Collaboration:   · Registered Nurse     After treatment position/precautions:   · Up in chair  · Bed/Chair-wheels locked  · Bed in low position  · Call light within reach  · RN notified  · Family at bedside  · Side rails x 3     Compliance with Program/Exercises: Will assess as treatment progresses.      Recommendations/Intent for next treatment session:  Treatment next visit will focus on increasing Ms. Skaggs's independence with bed mobility, transfers, gait training, strength/ROM exercises, modalities for pain, and patient education.        Total Treatment Duration:  PT Patient Time In/Time Out  Time In: 0900  Time Out: 42 Rosaura Kumar PTA

## 2017-06-16 NOTE — DISCHARGE SUMMARY
Medardo Coburn MD  Medical Director  [facility]  [place], [address] [facility]  Tel: [phone]       REHABILITATION DISCHARGE SUMMARY     Patient: Ryann Patricia MRN: [id number]  SSN: [id number]    YOB: 1930  Age: 80 y.o. Sex: female      Date: 6/16/2017  Admit Date: 6/12/2017  Discharge Date: 6/16/2017    Admitting Physician: Mauro Vincent MD   Primary Care Physician: Davidson Johnson DO     Admission Condition: good    Chief Complaint : Gait dysfunction secondary to below. Admit Diagnosis: Primary osteoarthritis of right hip [M16.11]  right total hip arthroplasty 6/12/2017  Pain  DVT risk  Post op hemorrhagic anemia  CKD (chronic kidney disease)  Arthritis  Acute Rehab Dx:  Gait impairment  Mobility and ambulation deficits  Self Care/ADL deficits    HPI: Ryann Patricia is a 80 y.o. female patient at [address] [place] [address] who was admitted on 6/12/2017 by Mauro Vincent MD with below mentioned medical history, is being seen for Physical Medicine and Rehabilitation consult. Ryann Patricia had painful right hip due to severe DJD. The symptoms were refractory to conservative treatment . She underwent a right total hip arthroplasty per Dr. Mauro Vincent MD on 6/12/2017. The patient's post operative course has been complicated by post op anemia, requiring transfusions yesterday. hgb improved. Therapy tolerated well so far. Patient's is to be WBAT RLE. Hip precautions to be followed strictly for RLE. Patient shows no major barriers. She is still limited by  right hip pain, decreased ROM and strength. Erica Beckwith is seen and examined. Medical Records reviewed. She denies any other deficits.  She is independent with ambulation at her baseline.       Rehabiitation Course:   Functional  Level On Admission: Walk: Moderate [place]  Functional Level At Discharge: Walk: Contact Guard Assist  Home Architecture: Home Situation  Home Environment: Private residence (06/12/17 1135)  # Steps to Enter: 2 (06/12/17 1135)  One/Two Story Residence: Two story (06/12/17 1135)  # of Interior Steps: 16 (06/12/17 1135)  Interior Rails: Left (06/12/17 1135)  Living Alone: No (06/12/17 1135)  Support Systems: Spouse/Significant Other/Partner (06/12/17 1135)  Patient Expects to be Discharged to[de-identified] Private residence (06/12/17 1135)  Current DME Used/Available at Home: Vernadine Peaks (06/12/17 1135)  Tub or Shower Type: Tub (06/12/17 1135)     Past Medical History:   Diagnosis Date    Arthritis     Cancer ([facility]     breast (right)    CKD (chronic kidney disease)     Dyslipidemia     GERD (gastroesophageal reflux disease)     Hx: UTI (urinary tract infection)     Hypertension     LBBB (left bundle branch block)     Pacemaker 2009    [facility]    SSS (sick sinus syndrome) ([facility]     Status post right hip replacement 6/12/2017    Thyroid disease       Past Surgical History:   Procedure Laterality Date    BREAST SURGERY PROCEDURE UNLISTED      x 3 from 2325-9217 for breast cancer    HX BUNIONECTOMY  07/28/2006    HX CARPAL TUNNEL RELEASE Left 2003    HX CATARACT REMOVAL Bilateral     1/7/2003, 2/25/2003    HX HEART CATHETERIZATION  09/2001    HX HERNIA REPAIR  05/24/2011    HX HYSTERECTOMY  01/1974    HX KNEE ARTHROSCOPY Bilateral     HX KNEE REPLACEMENT Bilateral     right (2/2005) and left (2/2006)    HX PACEMAKER      HX PACEMAKER PLACEMENT  07/15/2009    HX THYROIDECTOMY  2009      Family History   Problem Relation Age of Onset    Heart Disease Mother       Social History   Substance Use Topics    Smoking status: Never Smoker    Smokeless tobacco: Not on file    Alcohol use No       Allergies   Allergen Reactions    Other Medication Rash     Arthritis medication    Tiazac [Diltiazem Hcl] Rash       Prior to Admission medications    Medication Sig Start Date End Date Taking?  Authorizing Provider   ferrous sulfate 325 mg (65 mg iron) tablet Take 1 Tab by mouth Daily (before breakfast). 6/14/17  Yes Michael Olvera MD   aspirin delayed-release 325 mg tablet Take 1 Tab by mouth every twelve (12) hours every twelve (12) hours for 35 days. 6/12/17 7/17/17 Yes KAYLA Hinson   HYDROmorphone (DILAUDID) 2 mg tablet Take 1 Tab by mouth every four (4) hours as needed. Max Daily Amount: 12 mg. 6/12/17  Yes KAYLA Hinson   levothyroxine (SYNTHROID) 100 mcg tablet Take 100 mcg by mouth Daily (before breakfast). Take / use AM day of surgery  per anesthesia protocols. Yes Historical Provider   traMADol (ULTRAM) 50 mg tablet Take 50 mg by mouth every six (6) hours as needed for Pain. Take / use AM day of surgery  per anesthesia protocols if needed. Yes Historical Provider   omeprazole (PRILOSEC) 20 mg capsule Take 20 mg by mouth daily. Take / use AM day of surgery  per anesthesia protocols. Yes Historical Provider   bisoprolol (ZEBETA) 10 mg tablet Take 10 mg by mouth daily. Take / use AM day of surgery  per anesthesia protocols. Yes Historical Provider   aspirin 81 mg tablet Take 81 mg by mouth daily. Take / use AM day of surgery  per anesthesia protocols. 12/13/10  Yes Historical Provider   calcium-vitamin D (OYSTER SHELL) 500 mg(1,250mg) -200 unit per tablet Take 1 Tab by mouth daily. Historical Provider   ACETAMINOPHEN (TYLENOL PO) Take  by mouth as needed. Take / use AM day of surgery  per anesthesia protocols if needed. Historical Provider   amLODIPine-benazepril (LOTREL) 10-20 mg per capsule Take 1 capsule by mouth daily. Historical Provider   pravastatin (PRAVACHOL) 10 mg tablet Take 80 mg by mouth nightly. Historical Provider   alendronate (FOSAMAX) 70 mg tablet Take 70 mg by mouth Every Saturday. 12/18/10   Historical Provider   ezetimibe (ZETIA) 10 mg tablet Take 10 mg by mouth every evening.  12/13/10   Historical Provider       Current Medications:  Current Facility-Administered Medications   Medication Dose Route Frequency Provider Last Rate Last Dose    0.9% sodium chloride infusion 250 mL  250 mL IntraVENous PRN Maile Navarro MD        ferrous sulfate tablet 325 mg  1 Tab Oral DAILY WITH BREAKFAST Maile Navarro MD   325 mg at 06/16/17 0948    bisoprolol (ZEBETA) tablet 10 mg  10 mg Oral DAILY Soledad Rimes, PA   10 mg at 06/16/17 0900    levothyroxine (SYNTHROID) tablet 100 mcg  100 mcg Oral ACB Soledad Rimes, PA   100 mcg at 06/16/17 0507    pantoprazole (PROTONIX) tablet 40 mg  40 mg Oral ACB Soledad Rimes, PA   40 mg at 06/16/17 0507    sodium chloride (NS) flush 5-10 mL  5-10 mL IntraVENous Q8H Soledad Rimes, PA   5 mL at 06/16/17 0513    sodium chloride (NS) flush 5-10 mL  5-10 mL IntraVENous PRN Soledad Rimes, PA        acetaminophen (TYLENOL) tablet 1,000 mg  1,000 mg Oral Q6H Soledad Rimes, PA   1,000 mg at 06/16/17 0507    naloxone [facility]) injection 0.2-0.4 mg  0.2-0.4 mg IntraVENous Q10MIN PRN Soledad Rimes, PA        ondansetron [facility]) injection 4 mg  4 mg IntraVENous Q4H PRN Soledad Rimes, PA        diphenhydrAMINE (BENADRYL) capsule 25 mg  25 mg Oral Q4H PRN Soledad Rimes, PA   25 mg at 06/15/17 2229    senna-docusate (PERICOLACE) 8.6-50 mg per tablet 2 Tab  2 Tab Oral DAILY Soledad Rimes, PA   2 Tab at 06/16/17 8313    aspirin delayed-release tablet 325 mg  325 mg Oral Q12H Soledad Rimes, [place]   325 mg at 06/16/17 8212    amLODIPine/benazepril (LOTREL) 10/20 mg   Oral DAILY Maile Navarro MD            Review of Systems: Denies chest pain, shortness of breath, cough, headache, visual problems, abdominal pain, dysurea, calf pain. Pertinent positives are as noted in the medical records and unremarkable otherwise.     Vital Signs:   Patient Vitals for the past 8 hrs:   BP Temp Pulse Resp SpO2   06/16/17 0715 (!) 175/91 98.2 °F (36.8 °C) 79 16 98 %   06/16/17 0507 140/72 97.7 °F (36.5 °C) 71 14 (!) 75 %   06/16/17 0235 126/67 98 °F (36.7 °C) 73 14 94 %        Physical Exam: General: Alert and age appropriately oriented. No acute cardio respiratory distress. HEENT: Normocephalic. Oral mucosa moist without cyanosis. Lungs: Clear to auscultation  bilaterally. Respiration even and unlabored   Heart: Regular rate and rhythm, S1, S2   No  murmurs, clicks, rub or gallops   Abdomen: Soft, non-tender, nondistended. Bowel sounds present   Genitourinary: defered   Neuromuscular:      Grossly no focal neurological deficits noted. L Ankle dorsiflexion 5/5   L Ankle plantarflexion 5/5  R Ankle dorsiflexion 5/5   R Ankle plantarflexion 5/5  No sensory deficits. Skin/extremity: No rashes, no erythema. Calves soft. Wound covered.      Skin Incision(s)/Wound(s):        Lab Review:   Recent Results (from the past 72 hour(s))   HEMOGLOBIN    Collection Time: 06/14/17  4:45 AM   Result Value Ref Range    HGB 7.8 (L) 11.7 - 15.4 g/dL   HEMOGLOBIN    Collection Time: 06/15/17  4:25 AM   Result Value Ref Range    HGB 7.1 (L) 11.7 - 15.4 g/dL   PLEASE READ & DOCUMENT PPD TEST IN 72 HRS    Collection Time: 06/15/17  9:00 AM   Result Value Ref Range    PPD  Negative    mm Induration  mm   HEMOGLOBIN    Collection Time: 06/16/17  8:40 AM   Result Value Ref Range    HGB 13.0 11.7 - 15.4 g/dL   PLEASE READ & DOCUMENT PPD TEST IN 48 HRS    Collection Time: 06/16/17  9:00 AM   Result Value Ref Range    PPD  Negative    mm Induration  mm       PT Initial  PT Most Recent   AROM: Generally decreased, functional (BUE) (06/12/17 1134) Within functional limits (L LE) (06/12/17 1136)         Strength: Generally decreased, functional (BUE) (06/12/17 1134) Generally decreased, functional (L LE 3+/5) (06/12/17 1136)   Coordination: Generally decreased, functional (BUE) (06/12/17 1134) Generally decreased, functional (BUE) (06/12/17 1134)   Tone: Normal (06/12/17 1134) Normal (06/12/17 1134)   Sensation: Intact (06/12/17 1134) Intact (L LE) (06/12/17 1136)         Distance (ft): 2 Feet (ft) (06/12/17 1136) 80 Feet (ft) (06/15/17 1400)   Assistive Device: Walker, rolling (06/12/17 1136) Walker, rolling (06/15/17 1400)       OT Initial OT Most Recent                                               ST Initial ST Most Recent                        Principal Problem:    Status post right hip replacement (6/12/2017)    Active Problems:    Postoperative anemia (6/16/2017)          Condition on discharge from [facility] to SNF:  good  Rehabilitation  potential : Good   Goals in Rehab : Patient to reach maximal rehabilitation potential in functional mobility,ambulation and ADL/ self care skills ; to improve strength and endurance  Plan / Disposition :STR-Rehab  as discussed with patient/ family and the Case management/ Social service team  Expected Length Of Stay : Depending on pace of progress in mobility and self care in PT and OT ,therapies    Discharge Instructions:  Rehabilitation Management/ Medical Management: 1. Devices:Walkers, Type: Rolling Walker  2. Consult:Rehab team including PT, OT,  and . 3. Disposition Rehab-discussed with patient. 4. Thigh-high or knee-high DEAN's when out of bed. 5. DVT Prophylaxis - aspirin 325mg bid x 30days. Please notify surgeon at [address] [place] [address] if DVT diagnosed. 6. Incentive spirometer Q1H while awake  7. Post op hemorrhagic anemia- monitor. 8. Activity: WBAT RLE, resume total hip precautions. 9. Planned Labs: CBC,BMP  10. Pain Control: Continue scheduled tylenol, and  PRN meds. 11. Wound Care: May remove Aquacel 1 week post op and replace with Tegaderm and sterile gauze dressing. Keep wound clean and dry and reinforce dressing PRN. Remove staples 12-14 post surgery, when incision appears appropriately closed and apply benzoin and 1/2\" steristrips. 12. In case of complications: Please notify surgeon at [address] [place] [address] if suspect infection, cellulitis, wound dehiscence or instrument failure, and if considering starting antibiotic.       Follow up with ORTHO per instructions. [address] [facility] [phone]  Follow up with Dr Carlota Linn  2 weeks after discharge from rehab. [phone]. Transfer Medications:                   acetaminophen (TYLENOL) tablet 1,000 mg Ordered Dose: 1,000 mg Route: Oral Frequency: EVERY 8 HOURS x 1 week then change to prn.           senna-docusate (PERICOLACE) 8.6-50 mg per tablet 2 Tab Ordered Dose: 2 Tab Route: Oral Frequency: DAILY       Current Discharge Medication List      START taking these medications    Details   ferrous sulfate 325 mg (65 mg iron) tablet Take 1 Tab by mouth Daily (before breakfast). aspirin delayed-release 325 mg tablet Take 1 Tab by mouth every twelve (12) hours every twelve (12) hours for 35 days. Take with food or milk or full glass of water. Comments: This medication is to prevent blood clots for 5 weeks after surgery. CONTINUE these medications which have NOT CHANGED    Details   levothyroxine (SYNTHROID) 100 mcg tablet Take 100 mcg by mouth Daily (before breakfast). omeprazole (PRILOSEC) 20 mg capsule Take 20 mg by mouth daily. bisoprolol (ZEBETA) 10 mg tablet Take 10 mg by mouth daily. calcium-vitamin D (OYSTER SHELL) 500 mg(1,250mg) -200 unit per tablet Take 1 Tab by mouth daily. amLODIPine-benazepril (LOTREL) 10-20 mg per capsule Take 1 capsule by mouth daily. pravastatin (PRAVACHOL) 10 mg tablet Take 80 mg by mouth nightly. HOLD. alendronate (FOSAMAX) 70 mg tablet Take 70 mg by mouth Every Saturday. HOLD.       ezetimibe (ZETIA) 10 mg tablet Take 10 mg by mouth every evening. STOP taking these medications       traMADol (ULTRAM) 50 mg tablet Comments:   Reason for Stopping:         aspirin 81 mg tablet Comments:   Reason for Stopping:         ACETAMINOPHEN (TYLENOL PO) Comments:   Reason for Stopping:             O.K. Admit to sub acute rehab today. Discharge time: 35 minutes.     Signed By: Saritha Au MD     June 16, 2017

## 2017-07-21 ENCOUNTER — APPOINTMENT (RX ONLY)
Dept: URBAN - METROPOLITAN AREA CLINIC 24 | Facility: CLINIC | Age: 82
Setting detail: DERMATOLOGY
End: 2017-07-21

## 2017-07-21 DIAGNOSIS — L57.0 ACTINIC KERATOSIS: ICD-10-CM

## 2017-07-21 DIAGNOSIS — D18.0 HEMANGIOMA: ICD-10-CM

## 2017-07-21 DIAGNOSIS — D22 MELANOCYTIC NEVI: ICD-10-CM

## 2017-07-21 DIAGNOSIS — L81.4 OTHER MELANIN HYPERPIGMENTATION: ICD-10-CM

## 2017-07-21 DIAGNOSIS — L85.3 XEROSIS CUTIS: ICD-10-CM

## 2017-07-21 DIAGNOSIS — Z87.2 PERSONAL HISTORY OF DISEASES OF THE SKIN AND SUBCUTANEOUS TISSUE: ICD-10-CM

## 2017-07-21 DIAGNOSIS — D69.2 OTHER NONTHROMBOCYTOPENIC PURPURA: ICD-10-CM

## 2017-07-21 DIAGNOSIS — L82.1 OTHER SEBORRHEIC KERATOSIS: ICD-10-CM

## 2017-07-21 PROBLEM — D18.01 HEMANGIOMA OF SKIN AND SUBCUTANEOUS TISSUE: Status: ACTIVE | Noted: 2017-07-21

## 2017-07-21 PROBLEM — M12.9 ARTHROPATHY, UNSPECIFIED: Status: ACTIVE | Noted: 2017-07-21

## 2017-07-21 PROBLEM — D22.5 MELANOCYTIC NEVI OF TRUNK: Status: ACTIVE | Noted: 2017-07-21

## 2017-07-21 PROCEDURE — 17003 DESTRUCT PREMALG LES 2-14: CPT

## 2017-07-21 PROCEDURE — ? COUNSELING

## 2017-07-21 PROCEDURE — 99213 OFFICE O/P EST LOW 20 MIN: CPT | Mod: 25

## 2017-07-21 PROCEDURE — ? TREATMENT REGIMEN

## 2017-07-21 PROCEDURE — 17000 DESTRUCT PREMALG LESION: CPT

## 2017-07-21 PROCEDURE — ? LIQUID NITROGEN

## 2017-07-21 ASSESSMENT — LOCATION DETAILED DESCRIPTION DERM
LOCATION DETAILED: LEFT LATERAL UPPER BACK
LOCATION DETAILED: LEFT LATERAL FOREHEAD
LOCATION DETAILED: LEFT MEDIAL SUPERIOR CHEST
LOCATION DETAILED: LEFT PROXIMAL DORSAL FOREARM
LOCATION DETAILED: LEFT DISTAL POSTERIOR UPPER ARM
LOCATION DETAILED: EPIGASTRIC SKIN
LOCATION DETAILED: LEFT SUPERIOR LATERAL MALAR CHEEK
LOCATION DETAILED: LEFT FOREHEAD
LOCATION DETAILED: LEFT SUPERIOR UPPER BACK
LOCATION DETAILED: RIGHT POSTERIOR SHOULDER
LOCATION DETAILED: RIGHT MEDIAL UPPER BACK

## 2017-07-21 ASSESSMENT — LOCATION SIMPLE DESCRIPTION DERM
LOCATION SIMPLE: LEFT CHEEK
LOCATION SIMPLE: ABDOMEN
LOCATION SIMPLE: LEFT FOREARM
LOCATION SIMPLE: LEFT UPPER BACK
LOCATION SIMPLE: RIGHT SHOULDER
LOCATION SIMPLE: LEFT FOREHEAD
LOCATION SIMPLE: CHEST
LOCATION SIMPLE: LEFT POSTERIOR UPPER ARM
LOCATION SIMPLE: RIGHT UPPER BACK

## 2017-07-21 ASSESSMENT — LOCATION ZONE DERM
LOCATION ZONE: FACE
LOCATION ZONE: ARM
LOCATION ZONE: TRUNK

## 2017-07-21 NOTE — PROCEDURE: LIQUID NITROGEN
Detail Level: Simple
Consent: The patient's consent was obtained including but not limited to risks of crusting, scabbing, blistering, scarring, darker or lighter pigmentary change, recurrence, incomplete removal and infection.
Duration Of Freeze Thaw-Cycle (Seconds): 4
Render Post-Care Instructions In Note?: no
Post-Care Instructions: I reviewed with the patient in detail post-care instructions. Patient is to wear sunprotection, and avoid picking at any of the treated lesions. Pt may apply Vaseline to crusted or scabbing areas.
Number Of Freeze-Thaw Cycles: 1 freeze-thaw cycle

## 2017-10-05 ENCOUNTER — HOSPITAL ENCOUNTER (OUTPATIENT)
Dept: INTERVENTIONAL RADIOLOGY/VASCULAR | Age: 82
Discharge: HOME | End: 2017-10-05
Attending: PHYSICAL MEDICINE & REHABILITATION
Payer: MEDICARE

## 2017-10-05 ENCOUNTER — HOSPITAL ENCOUNTER (OUTPATIENT)
Dept: CT IMAGING | Age: 82
Discharge: HOME | End: 2017-10-05
Attending: PHYSICAL MEDICINE & REHABILITATION
Payer: MEDICARE

## 2017-10-05 VITALS
TEMPERATURE: 98.9 F | HEIGHT: 65 IN | WEIGHT: 130 LBS | OXYGEN SATURATION: 97 % | RESPIRATION RATE: 17 BRPM | DIASTOLIC BLOOD PRESSURE: 74 MMHG | SYSTOLIC BLOOD PRESSURE: 160 MMHG | HEART RATE: 89 BPM | BODY MASS INDEX: 21.66 KG/M2

## 2017-10-05 DIAGNOSIS — M48.062 SPINAL STENOSIS OF LUMBAR REGION WITH NEUROGENIC CLAUDICATION: ICD-10-CM

## 2017-10-05 PROCEDURE — 72132 CT LUMBAR SPINE W/DYE: CPT

## 2017-10-05 PROCEDURE — 74011000250 HC RX REV CODE- 250: Performed by: PHYSICIAN ASSISTANT

## 2017-10-05 PROCEDURE — 77030003666 HC NDL SPINAL BD -A

## 2017-10-05 PROCEDURE — 77030014143 HC TY PUNC LUMBR BD -A

## 2017-10-05 PROCEDURE — 62304 MYELOGRAPHY LUMBAR INJECTION: CPT

## 2017-10-05 PROCEDURE — 74011636320 HC RX REV CODE- 636/320: Performed by: PHYSICIAN ASSISTANT

## 2017-10-05 RX ORDER — ASPIRIN 81 MG/1
81 TABLET ORAL DAILY
COMMUNITY

## 2017-10-05 RX ORDER — LIDOCAINE HYDROCHLORIDE 20 MG/ML
1-400 INJECTION, SOLUTION INFILTRATION; PERINEURAL
Status: DISCONTINUED | OUTPATIENT
Start: 2017-10-05 | End: 2017-10-09 | Stop reason: HOSPADM

## 2017-10-05 RX ADMIN — IOPAMIDOL 18 ML: 408 INJECTION, SOLUTION INTRATHECAL at 08:38

## 2017-10-05 RX ADMIN — LIDOCAINE HYDROCHLORIDE 120 MG: 20 INJECTION, SOLUTION INFILTRATION; PERINEURAL at 08:35

## 2017-10-05 NOTE — PROGRESS NOTES
Recovery period without difficulty. Pt alert and oriented and denies pain. Dressing is clean, dry, and intact. Reviewed discharge instructions with patient and , both verbalized understanding. Pt escorted to lobby discharge area via wheelchair. Vital signs completed.

## 2017-10-05 NOTE — IP AVS SNAPSHOT
[address] [place] [address] 
[phone] Patient: Anita Woodruff MRN: [id number] OCC:4/8/1004 Current Discharge Medication List  
  
ASK your doctor about these medications Dose & Instructions Dispensing Information Comments Morning Noon Evening Bedtime  
 aspirin delayed-release 81 mg tablet Your last dose was: Your next dose is:    
   
   
 Dose:  81 mg Take 81 mg by mouth daily. Refills:  0  
     
   
   
   
  
 calcium-vitamin D 500 mg(1,250mg) -200 unit per tablet Commonly known as:  OYSTER SHELL Your last dose was: Your next dose is:    
   
   
 Dose:  1 Tab Take 1 Tab by mouth daily. Refills:  0  
     
   
   
   
  
 ferrous sulfate 325 mg (65 mg iron) tablet Your last dose was: Your next dose is:    
   
   
 Dose:  325 mg Take 1 Tab by mouth Daily (before breakfast). Quantity:  90 Tab Refills:  0  
     
   
   
   
  
 FOSAMAX 70 mg tablet Generic drug:  alendronate Your last dose was: Your next dose is:    
   
   
 Dose:  70 mg Take 70 mg by mouth Every Saturday. Refills:  0 HYDROmorphone 2 mg tablet Commonly known as:  DILAUDID Your last dose was: Your next dose is:    
   
   
 Dose:  2 mg Take 1 Tab by mouth every four (4) hours as needed. Max Daily Amount: 12 mg. Quantity:  40 Tab Refills:  0 LOTREL 10-20 mg per capsule Generic drug:  amLODIPine-benazepril Your last dose was: Your next dose is:    
   
   
 Dose:  1 Cap Take 1 capsule by mouth daily. Refills:  0 PRAVACHOL 10 mg tablet Generic drug:  pravastatin Your last dose was: Your next dose is:    
   
   
 Dose:  80 mg Take 80 mg by mouth nightly. Refills:  0 PriLOSEC 20 mg capsule Generic drug:  omeprazole Your last dose was: Your next dose is:    
   
   
 Dose:  20 mg Take 20 mg by mouth daily. Take / use AM day of surgery  per anesthesia protocols. Refills:  0  
     
   
   
   
  
 SYNTHROID 100 mcg tablet Generic drug:  levothyroxine Your last dose was: Your next dose is:    
   
   
 Dose:  100 mcg Take 100 mcg by mouth Daily (before breakfast). Take / use AM day of surgery  per anesthesia protocols. Refills:  0 ZEBETA 10 mg tablet Generic drug:  bisoprolol Your last dose was: Your next dose is:    
   
   
 Dose:  10 mg Take 10 mg by mouth daily. Take / use AM day of surgery  per anesthesia protocols. Refills:  0 ZETIA 10 mg tablet Generic drug:  ezetimibe Your last dose was: Your next dose is:    
   
   
 Dose:  10 mg Take 10 mg by mouth every evening. Refills:  0

## 2017-10-05 NOTE — PROCEDURES
Department of Interventional Radiology  [phone]        Interventional Radiology Brief Procedure Note    Patient: Dean Garcia MRN: [id number]  SSN: [id number]    YOB: 1930  Age: 80 y.o. Sex: female      Date of Procedure: 10/5/2017    Pre-Procedure Diagnosis: low back pain, LLE pain    Post-Procedure Diagnosis: SAME    Procedure(s): Myelogram    Brief Description of Procedure: fluoro guided LP, contrast injected, needle removed    Performed By: Kiley Kinney PA-C     Assistants: None    Anesthesia:Lidocaine    Estimated Blood Loss: None    Specimens: None    Implants: None    Findings: See dictated report.      Complications: None    Recommendations: bedrest, CT     Follow Up: referring MD    Signed By: Kiley Kinney PA-C     October 5, 2017

## 2017-10-05 NOTE — PROGRESS NOTES
TRANSFER - IN REPORT:    Verbal report received from [facility]) on Andre Le  being received from IR(unit) for routine post - op      Report consisted of patients Situation, Background, Assessment and   Recommendations(SBAR). Information from the following report(s) Procedure Summary and MAR was reviewed with the receiving nurse. Opportunity for questions and clarification was provided. Assessment completed upon patients arrival to unit and care assumed.

## 2017-10-05 NOTE — IP AVS SNAPSHOT
Zahraa Bee 
 
 
 [address] [facility] [address] [facility] 
[phone] Patient: Danielito Murillo MRN: [id number] ULB:0/6/5494 You are allergic to the following Allergen Reactions Other Medication Rash Arthritis medication Tiazac (Diltiazem Hcl) Rash Recent Documentation Height Weight BMI Smoking Status 1.651 m 59 kg 21.63 kg/m2 Never Smoker Emergency Contacts Name Discharge Info Relation Home Work Mobile Justin Skaggs DISCHARGE CAREGIVER [3] Spouse [3] [phone] Radha Fernando  Other Relative [6] [phone] About your hospitalization You were admitted on:  October 5, 2017 You last received care in the:  [facility] You were discharged on:  October 5, 2017 Unit phone number:  [phone] Why you were hospitalized Your primary diagnosis was:  Not on File Providers Seen During Your Hospitalizations Provider Role Specialty Primary office phone Starr Vincent MD Attending Provider Orthopedic Surgery [phone] Your Primary Care Physician (PCP) Primary Care Physician Office Phone Office Fax Juanito Alanis [phone] Follow-up Information None Your Appointments Thursday October 05, 2017 11:30 AM EDT  
CT POST MYELO with [facility] CT [address] SLICE UNIT 1  
[facility] RAD[facility]OLOG[facility] CT SCAN ([address] [place] [address]) [address] [address] [address] [facility]  
[phone]  
  
   
 2nd Part of Interventional Radiology procedure. Scan completed in CT department. Referring Physicians: 1) Fax H&P/recent office notes and order to Interventional Radiology. Lab work not typically required unless Pt condition dictates.  2)Patients with contrast dye allergies must be pre medicated prior to arrival. 3) Obtain clearance to hold blood thinners from prescribing Physician and give Patient instructions prior to arrival. 4) Continue medications and arrive well hydrated. 6) Pt to arrive 45 minutes early. This is a non sedation procedure. 7) Responsible adult  required to drive Patient home after recovery period. Recovery period can vary depending on patient condition. 8) Interventional Radiology Scheduling can be contacted at [phone] Current Discharge Medication List  
  
ASK your doctor about these medications Dose & Instructions Dispensing Information Comments Morning Noon Evening Bedtime  
 aspirin delayed-release 81 mg tablet Your last dose was: Your next dose is:    
   
   
 Dose:  81 mg Take 81 mg by mouth daily. Refills:  0  
     
   
   
   
  
 calcium-vitamin D 500 mg(1,250mg) -200 unit per tablet Commonly known as:  OYSTER SHELL Your last dose was: Your next dose is:    
   
   
 Dose:  1 Tab Take 1 Tab by mouth daily. Refills:  0  
     
   
   
   
  
 ferrous sulfate 325 mg (65 mg iron) tablet Your last dose was: Your next dose is:    
   
   
 Dose:  325 mg Take 1 Tab by mouth Daily (before breakfast). Quantity:  90 Tab Refills:  0  
     
   
   
   
  
 FOSAMAX 70 mg tablet Generic drug:  alendronate Your last dose was: Your next dose is:    
   
   
 Dose:  70 mg Take 70 mg by mouth Every Saturday. Refills:  0 HYDROmorphone 2 mg tablet Commonly known as:  DILAUDID Your last dose was: Your next dose is:    
   
   
 Dose:  2 mg Take 1 Tab by mouth every four (4) hours as needed. Max Daily Amount: 12 mg. Quantity:  40 Tab Refills:  0 LOTREL 10-20 mg per capsule Generic drug:  amLODIPine-benazepril Your last dose was: Your next dose is:    
   
   
 Dose:  1 Cap Take 1 capsule by mouth daily. Refills:  0 PRAVACHOL 10 mg tablet Generic drug:  pravastatin Your last dose was: Your next dose is:    
   
   
 Dose:  80 mg Take 80 mg by mouth nightly. Refills:  0 PriLOSEC 20 mg capsule Generic drug:  omeprazole Your last dose was: Your next dose is:    
   
   
 Dose:  20 mg Take 20 mg by mouth daily. Take / use AM day of surgery  per anesthesia protocols. Refills:  0  
     
   
   
   
  
 SYNTHROID 100 mcg tablet Generic drug:  levothyroxine Your last dose was: Your next dose is:    
   
   
 Dose:  100 mcg Take 100 mcg by mouth Daily (before breakfast). Take / use AM day of surgery  per anesthesia protocols. Refills:  0 ZEBETA 10 mg tablet Generic drug:  bisoprolol Your last dose was: Your next dose is:    
   
   
 Dose:  10 mg Take 10 mg by mouth daily. Take / use AM day of surgery  per anesthesia protocols. Refills:  0 ZETIA 10 mg tablet Generic drug:  ezetimibe Your last dose was: Your next dose is:    
   
   
 Dose:  10 mg Take 10 mg by mouth every evening. Refills:  0 Discharge Instructions Jonathanigi [address] [facility] Department of Interventional Radiology 
[phone] Office 
[phone] Fax POST LUMBAR PUNCTURE/MYELOGRAM/INTRATHECAL CHEMOTHERAPY DISCHARGE INSTRUCTIONS General Information: 
Lumbar Puncture: A LP is done to help diagnose several disorders, like pseudo tumor, migraines, meningitis, and multiple sclerosis. It involves a puncture (usually in the lower spine) into the sac that protects the spinal column. A sample of the fluid in that space is removed and tested in the lab. Myelogram: A Myelogram involves a lumbar puncture, and instead of removing fluid, contrast will be injected into the sac surrounding the spinal column.  It is done to visualize the spinal column, nerve roots, spinal canal, vertebral discs and disc space. It is usually done to diagnose back pain with unknown cause or in preparation for surgery. After the injection, a CT scan will be done, usually within two hours of the injection. Intrathecal Chemotherapy:  
   Chemotherapy can be given in many forms. Intrathecal chemo involves a lumbar puncture, and instead of removing fluid, the chemo will be injected into the space. After any of procedures, you will be asked to lie flat on your back for 4-6 hours to prevent complications. You should also rest for 24 hours after you go home, and force fluids. If you have a headache, you should take Tylenol or acetaminophen. Call If: 
   You should call your Physician and/or the Radiology Nurse if you develop a headache that is not relieved by Tylenol, and worsens when you stand and eases when you lie down, you need to call. You may have developed what is referred to as a spinal headache. Our physician's will probably advise you to be on strict bed rest for 24 hours, to drink lots of fluids and caffeine. If this does not help the head pain, call again the next day. You should call if you have bleeding other than a small spot on your bandage. You should call if you have any numbness, tingling, weakness, fever, chills, urinary retention, severe itching, rash, welts, swelling, or confusion. Follow-Up Instructions: See the doctor who ordered your procedure as he/she has instructed. If you had a Lumbar Puncture or Myelogram, your results should be available to your ordering doctor in 3-5 business days. You can remove your dressing in 24 hours and shower regularly. Do not bathe or swim for 72 hours. To Reach Us: If you have any questions about your procedure, please call the Interventional Radiology department at [phone]. After business hours (5pm) and weekends, call the answering service at [phone] and ask for the Radiologist on call to be paged. Interventional Radiology General Nurse Discharge After general anesthesia or intravenous sedation, for 24 hours or while taking prescription Narcotics: · Limit your activities · Do not drive and operate hazardous machinery · Do not make important personal or business decisions · Do  not drink alcoholic beverages · If you have not urinated within 8 hours after discharge, please contact your surgeon on call. * Please give a list of your current medications to your Primary Care Provider. * Please update this list whenever your medications are discontinued, doses are 
   changed, or new medications (including over-the-counter products) are added. * Please carry medication information at all times in case of emergency situations. These are general instructions for a healthy lifestyle: No smoking/ No tobacco products/ Avoid exposure to second hand smoke Surgeon General's Warning:  Quitting smoking now greatly reduces serious risk to your health. Obesity, smoking, and sedentary lifestyle greatly increases your risk for illness A healthy diet, regular physical exercise & weight monitoring are important for maintaining a healthy lifestyle You may be retaining fluid if you have a history of heart failure or if you experience any of the following symptoms:  Weight gain of 3 pounds or more overnight or 5 pounds in a week, increased swelling in our hands or feet or shortness of breath while lying flat in bed. Please call your doctor as soon as you notice any of these symptoms; do not wait until your next office visit. Recognize signs and symptoms of STROKE: 
F-face looks uneven A-arms unable to move or move unevenly S-speech slurred or non-existent T-time-call 911 as soon as signs and symptoms begin-DO NOT go Back to bed or wait to see if you get better-TIME IS BRAIN. Patient Signature: 
Date: 10/5/2017 Discharging Nurse: Frankie Castano Discharge Orders None Introducing [facility]! [employer] introduces "[employer] patient portal. Now you can access parts of your medical record, email your doctor's office, and request medication refills online. 1. In your internet browser, go to [email or web address] 2. Click on the First Time User? Click Here link in the Sign In box. You will see the New Member Sign Up page. 3. Enter your "[employer] Access Code exactly as it appears below. You will not need to use this code after youve completed the sign-up process. If you do not sign up before the expiration date, you must request a new code. · "[employer] Access Code: [email or web address] Expires: 12/31/2017  4:40 PM 
 
4. Enter the last four digits of your Social Security Number (xxxx) and Date of Birth (mm/dd/yyyy) as indicated and click Submit. You will be taken to the next sign-up page. 5. Create a "[employer] ID. This will be your "[employer] login ID and cannot be changed, so think of one that is secure and easy to remember. 6. Create a "[employer] password. You can change your password at any time. 7. Enter your Password Reset Question and Answer. This can be used at a later time if you forget your password. 8. Enter your e-mail address. You will receive e-mail notification when new information is available in [address] 9. Click Sign Up. You can now view and download portions of your medical record. 10. Click the Download Summary menu link to download a portable copy of your medical information. If you have questions, please visit the Frequently Asked Questions section of the "[employer] website. Remember, "[employer] is NOT to be used for urgent needs. For medical emergencies, dial 911. Now available from your iPhone and Android! General Information Please provide this summary of care documentation to your next provider. Patient Signature:  ____________________________________________________________ Date:  ____________________________________________________________  
  
Althia Kras Provider Signature:  ____________________________________________________________ Date:  ____________________________________________________________

## 2018-02-14 ENCOUNTER — APPOINTMENT (RX ONLY)
Dept: URBAN - METROPOLITAN AREA CLINIC 24 | Facility: CLINIC | Age: 83
Setting detail: DERMATOLOGY
End: 2018-02-14

## 2018-02-14 DIAGNOSIS — L81.4 OTHER MELANIN HYPERPIGMENTATION: ICD-10-CM

## 2018-02-14 DIAGNOSIS — L30.9 DERMATITIS, UNSPECIFIED: ICD-10-CM

## 2018-02-14 DIAGNOSIS — Z87.2 PERSONAL HISTORY OF DISEASES OF THE SKIN AND SUBCUTANEOUS TISSUE: ICD-10-CM

## 2018-02-14 DIAGNOSIS — D69.2 OTHER NONTHROMBOCYTOPENIC PURPURA: ICD-10-CM

## 2018-02-14 DIAGNOSIS — D22 MELANOCYTIC NEVI: ICD-10-CM

## 2018-02-14 DIAGNOSIS — D18.0 HEMANGIOMA: ICD-10-CM

## 2018-02-14 DIAGNOSIS — L82.1 OTHER SEBORRHEIC KERATOSIS: ICD-10-CM

## 2018-02-14 PROBLEM — Z85.3 PERSONAL HISTORY OF MALIGNANT NEOPLASM OF BREAST: Status: ACTIVE | Noted: 2018-02-14

## 2018-02-14 PROBLEM — E78.5 HYPERLIPIDEMIA, UNSPECIFIED: Status: ACTIVE | Noted: 2018-02-14

## 2018-02-14 PROBLEM — M12.9 ARTHROPATHY, UNSPECIFIED: Status: ACTIVE | Noted: 2018-02-14

## 2018-02-14 PROBLEM — D18.01 HEMANGIOMA OF SKIN AND SUBCUTANEOUS TISSUE: Status: ACTIVE | Noted: 2018-02-14

## 2018-02-14 PROBLEM — D22.5 MELANOCYTIC NEVI OF TRUNK: Status: ACTIVE | Noted: 2018-02-14

## 2018-02-14 PROCEDURE — ? TREATMENT REGIMEN

## 2018-02-14 PROCEDURE — 99213 OFFICE O/P EST LOW 20 MIN: CPT

## 2018-02-14 PROCEDURE — ? OTHER

## 2018-02-14 PROCEDURE — ? COUNSELING

## 2018-02-14 ASSESSMENT — LOCATION DETAILED DESCRIPTION DERM
LOCATION DETAILED: EPIGASTRIC SKIN
LOCATION DETAILED: LEFT MEDIAL SUPERIOR CHEST
LOCATION DETAILED: RIGHT MEDIAL UPPER BACK
LOCATION DETAILED: RIGHT VENTRAL PROXIMAL FOREARM
LOCATION DETAILED: RIGHT POSTERIOR SHOULDER
LOCATION DETAILED: LEFT LATERAL UPPER BACK
LOCATION DETAILED: LEFT DISTAL POSTERIOR UPPER ARM
LOCATION DETAILED: LEFT SUPERIOR UPPER BACK
LOCATION DETAILED: LEFT VENTRAL PROXIMAL FOREARM
LOCATION DETAILED: LEFT PROXIMAL DORSAL FOREARM

## 2018-02-14 ASSESSMENT — LOCATION SIMPLE DESCRIPTION DERM
LOCATION SIMPLE: LEFT FOREARM
LOCATION SIMPLE: LEFT POSTERIOR UPPER ARM
LOCATION SIMPLE: LEFT UPPER BACK
LOCATION SIMPLE: RIGHT FOREARM
LOCATION SIMPLE: RIGHT SHOULDER
LOCATION SIMPLE: RIGHT UPPER BACK
LOCATION SIMPLE: CHEST
LOCATION SIMPLE: ABDOMEN

## 2018-02-14 ASSESSMENT — LOCATION ZONE DERM
LOCATION ZONE: TRUNK
LOCATION ZONE: ARM

## 2018-02-14 NOTE — PROCEDURE: OTHER
Other (Free Text): She complains of persistent itching but there is no appreciable rash and I only see xerosis so we will focus on this
Note Text (......Xxx Chief Complaint.): This diagnosis correlates with the
Detail Level: Simple

## 2018-03-19 ENCOUNTER — APPOINTMENT (RX ONLY)
Dept: URBAN - METROPOLITAN AREA CLINIC 24 | Facility: CLINIC | Age: 83
Setting detail: DERMATOLOGY
End: 2018-03-19

## 2018-03-19 DIAGNOSIS — L259 CONTACT DERMATITIS AND OTHER ECZEMA, UNSPECIFIED CAUSE: ICD-10-CM

## 2018-03-19 DIAGNOSIS — I87.2 VENOUS INSUFFICIENCY (CHRONIC) (PERIPHERAL): ICD-10-CM

## 2018-03-19 PROBLEM — I83.10 VARICOSE VEINS OF UNSPECIFIED LOWER EXTREMITY WITH INFLAMMATION: Status: ACTIVE | Noted: 2018-03-19

## 2018-03-19 PROBLEM — I10 ESSENTIAL (PRIMARY) HYPERTENSION: Status: ACTIVE | Noted: 2018-03-19

## 2018-03-19 PROBLEM — L23.9 ALLERGIC CONTACT DERMATITIS, UNSPECIFIED CAUSE: Status: ACTIVE | Noted: 2018-03-19

## 2018-03-19 PROBLEM — I83.11 VARICOSE VEINS OF RIGHT LOWER EXTREMITY WITH INFLAMMATION: Status: ACTIVE | Noted: 2018-03-19

## 2018-03-19 PROBLEM — E03.9 HYPOTHYROIDISM, UNSPECIFIED: Status: ACTIVE | Noted: 2018-03-19

## 2018-03-19 PROBLEM — I83.12 VARICOSE VEINS OF LEFT LOWER EXTREMITY WITH INFLAMMATION: Status: ACTIVE | Noted: 2018-03-19

## 2018-03-19 PROCEDURE — ? TREATMENT REGIMEN

## 2018-03-19 PROCEDURE — ? PRESCRIPTION

## 2018-03-19 PROCEDURE — ? COUNSELING

## 2018-03-19 PROCEDURE — 99213 OFFICE O/P EST LOW 20 MIN: CPT

## 2018-03-19 RX ORDER — TRIAMCINOLONE ACETONIDE 1 MG/G
OINTMENT TOPICAL
Qty: 1 | Refills: 1 | Status: ERX | COMMUNITY
Start: 2018-03-19

## 2018-03-19 RX ADMIN — TRIAMCINOLONE ACETONIDE: 1 OINTMENT TOPICAL at 19:41

## 2018-03-19 ASSESSMENT — LOCATION SIMPLE DESCRIPTION DERM
LOCATION SIMPLE: ABDOMEN
LOCATION SIMPLE: RIGHT PRETIBIAL REGION
LOCATION SIMPLE: LEFT PRETIBIAL REGION

## 2018-03-19 ASSESSMENT — LOCATION ZONE DERM
LOCATION ZONE: TRUNK
LOCATION ZONE: LEG

## 2018-03-19 ASSESSMENT — LOCATION DETAILED DESCRIPTION DERM
LOCATION DETAILED: RIGHT PROXIMAL PRETIBIAL REGION
LOCATION DETAILED: SUBXIPHOID
LOCATION DETAILED: LEFT DISTAL PRETIBIAL REGION
LOCATION DETAILED: LEFT PROXIMAL PRETIBIAL REGION

## 2018-03-20 ENCOUNTER — RX ONLY (OUTPATIENT)
Age: 83
Setting detail: RX ONLY
End: 2018-03-20

## 2018-03-20 RX ORDER — PREDNISONE 20 MG/1
TABLET ORAL
Qty: 18 | Refills: 0 | Status: ERX | COMMUNITY
Start: 2018-03-20

## 2018-03-21 ENCOUNTER — RX ONLY (OUTPATIENT)
Age: 83
Setting detail: RX ONLY
End: 2018-03-21

## 2018-03-21 RX ORDER — TRIAMCINOLONE ACETONIDE 1 MG/G
OINTMENT TOPICAL
Qty: 1 | Refills: 1 | Status: ERX

## 2018-04-04 ENCOUNTER — APPOINTMENT (RX ONLY)
Dept: URBAN - METROPOLITAN AREA CLINIC 24 | Facility: CLINIC | Age: 83
Setting detail: DERMATOLOGY
End: 2018-04-04

## 2018-04-04 DIAGNOSIS — I87.2 VENOUS INSUFFICIENCY (CHRONIC) (PERIPHERAL): ICD-10-CM | Status: IMPROVED

## 2018-04-04 DIAGNOSIS — L259 CONTACT DERMATITIS AND OTHER ECZEMA, UNSPECIFIED CAUSE: ICD-10-CM | Status: IMPROVED

## 2018-04-04 DIAGNOSIS — L82.1 OTHER SEBORRHEIC KERATOSIS: ICD-10-CM

## 2018-04-04 PROBLEM — L23.9 ALLERGIC CONTACT DERMATITIS, UNSPECIFIED CAUSE: Status: ACTIVE | Noted: 2018-04-04

## 2018-04-04 PROBLEM — I83.10 VARICOSE VEINS OF UNSPECIFIED LOWER EXTREMITY WITH INFLAMMATION: Status: ACTIVE | Noted: 2018-04-04

## 2018-04-04 PROBLEM — I10 ESSENTIAL (PRIMARY) HYPERTENSION: Status: ACTIVE | Noted: 2018-04-04

## 2018-04-04 PROBLEM — I83.12 VARICOSE VEINS OF LEFT LOWER EXTREMITY WITH INFLAMMATION: Status: ACTIVE | Noted: 2018-04-04

## 2018-04-04 PROBLEM — M12.9 ARTHROPATHY, UNSPECIFIED: Status: ACTIVE | Noted: 2018-04-04

## 2018-04-04 PROBLEM — Z85.3 PERSONAL HISTORY OF MALIGNANT NEOPLASM OF BREAST: Status: ACTIVE | Noted: 2018-04-04

## 2018-04-04 PROBLEM — I83.11 VARICOSE VEINS OF RIGHT LOWER EXTREMITY WITH INFLAMMATION: Status: ACTIVE | Noted: 2018-04-04

## 2018-04-04 PROCEDURE — ? COUNSELING

## 2018-04-04 PROCEDURE — ? TREATMENT REGIMEN

## 2018-04-04 PROCEDURE — 99212 OFFICE O/P EST SF 10 MIN: CPT

## 2018-04-04 ASSESSMENT — LOCATION SIMPLE DESCRIPTION DERM
LOCATION SIMPLE: LEFT UPPER ARM
LOCATION SIMPLE: RIGHT PRETIBIAL REGION
LOCATION SIMPLE: ABDOMEN
LOCATION SIMPLE: LEFT PRETIBIAL REGION

## 2018-04-04 ASSESSMENT — LOCATION DETAILED DESCRIPTION DERM
LOCATION DETAILED: LEFT ANTERIOR LATERAL DISTAL UPPER ARM
LOCATION DETAILED: LEFT DISTAL PRETIBIAL REGION
LOCATION DETAILED: SUBXIPHOID
LOCATION DETAILED: RIGHT PROXIMAL PRETIBIAL REGION
LOCATION DETAILED: LEFT PROXIMAL PRETIBIAL REGION
LOCATION DETAILED: LEFT ANTERIOR PROXIMAL UPPER ARM

## 2018-04-04 ASSESSMENT — LOCATION ZONE DERM
LOCATION ZONE: TRUNK
LOCATION ZONE: ARM
LOCATION ZONE: LEG

## 2018-04-04 NOTE — PROCEDURE: TREATMENT REGIMEN
Continue Regimen: TAC as needed
Otc Regimen: Discontinue all other topical creams and treatments
Initiate Treatment: Atopic precautions
Detail Level: Zone

## 2018-05-07 ENCOUNTER — RX ONLY (OUTPATIENT)
Age: 83
Setting detail: RX ONLY
End: 2018-05-07

## 2018-05-07 RX ORDER — TRIAMCINOLONE ACETONIDE 1 MG/G
OINTMENT TOPICAL
Qty: 1 | Refills: 0 | Status: ERX

## 2018-06-06 ENCOUNTER — APPOINTMENT (RX ONLY)
Dept: URBAN - METROPOLITAN AREA CLINIC 24 | Facility: CLINIC | Age: 83
Setting detail: DERMATOLOGY
End: 2018-06-06

## 2018-06-06 DIAGNOSIS — L30.9 DERMATITIS, UNSPECIFIED: ICD-10-CM

## 2018-06-06 DIAGNOSIS — I87.2 VENOUS INSUFFICIENCY (CHRONIC) (PERIPHERAL): ICD-10-CM

## 2018-06-06 DIAGNOSIS — L82.1 OTHER SEBORRHEIC KERATOSIS: ICD-10-CM

## 2018-06-06 PROBLEM — I83.11 VARICOSE VEINS OF RIGHT LOWER EXTREMITY WITH INFLAMMATION: Status: ACTIVE | Noted: 2018-06-06

## 2018-06-06 PROBLEM — I83.12 VARICOSE VEINS OF LEFT LOWER EXTREMITY WITH INFLAMMATION: Status: ACTIVE | Noted: 2018-06-06

## 2018-06-06 PROBLEM — I10 ESSENTIAL (PRIMARY) HYPERTENSION: Status: ACTIVE | Noted: 2018-06-06

## 2018-06-06 PROBLEM — M12.9 ARTHROPATHY, UNSPECIFIED: Status: ACTIVE | Noted: 2018-06-06

## 2018-06-06 PROBLEM — E78.5 HYPERLIPIDEMIA, UNSPECIFIED: Status: ACTIVE | Noted: 2018-06-06

## 2018-06-06 PROBLEM — I83.10 VARICOSE VEINS OF UNSPECIFIED LOWER EXTREMITY WITH INFLAMMATION: Status: ACTIVE | Noted: 2018-06-06

## 2018-06-06 PROBLEM — E03.9 HYPOTHYROIDISM, UNSPECIFIED: Status: ACTIVE | Noted: 2018-06-06

## 2018-06-06 PROCEDURE — ? TREATMENT REGIMEN

## 2018-06-06 PROCEDURE — ? COUNSELING

## 2018-06-06 PROCEDURE — 99213 OFFICE O/P EST LOW 20 MIN: CPT

## 2018-06-06 PROCEDURE — ? OTHER

## 2018-06-06 ASSESSMENT — LOCATION SIMPLE DESCRIPTION DERM
LOCATION SIMPLE: LEFT UPPER BACK
LOCATION SIMPLE: LEFT PRETIBIAL REGION
LOCATION SIMPLE: LEFT FOREARM
LOCATION SIMPLE: RIGHT PRETIBIAL REGION
LOCATION SIMPLE: POSTERIOR NECK
LOCATION SIMPLE: RIGHT UPPER BACK
LOCATION SIMPLE: ABDOMEN
LOCATION SIMPLE: CHEST
LOCATION SIMPLE: RIGHT FOREARM

## 2018-06-06 ASSESSMENT — LOCATION DETAILED DESCRIPTION DERM
LOCATION DETAILED: RIGHT SUPERIOR UPPER BACK
LOCATION DETAILED: SUBXIPHOID
LOCATION DETAILED: RIGHT DISTAL PRETIBIAL REGION
LOCATION DETAILED: LEFT PROXIMAL DORSAL FOREARM
LOCATION DETAILED: RIGHT PROXIMAL DORSAL FOREARM
LOCATION DETAILED: LEFT DISTAL RADIAL DORSAL FOREARM
LOCATION DETAILED: RIGHT PROXIMAL PRETIBIAL REGION
LOCATION DETAILED: LEFT MID-UPPER BACK
LOCATION DETAILED: RIGHT DISTAL DORSAL FOREARM
LOCATION DETAILED: LEFT DISTAL PRETIBIAL REGION
LOCATION DETAILED: LEFT LATERAL SUPERIOR CHEST
LOCATION DETAILED: LEFT LATERAL TRAPEZIAL NECK
LOCATION DETAILED: LEFT PROXIMAL PRETIBIAL REGION

## 2018-06-06 ASSESSMENT — LOCATION ZONE DERM
LOCATION ZONE: NECK
LOCATION ZONE: LEG
LOCATION ZONE: TRUNK
LOCATION ZONE: ARM

## 2018-06-06 NOTE — PROCEDURE: TREATMENT REGIMEN
Continue Regimen: TAC as needed, atopic precautions
Plan: Discussed phototherapy to possible help with Pruritus. Also discussed this could be a chronic condition and there would be ways to help but not cure.
Initiate Treatment: May have to restart wearing compression stockings to help with the swelling.
Detail Level: Zone
Otc Regimen: Recommended Sarna lotion, Shayne’s vapor rub (to most itchy places) cerave anti itch lotion.
Samples Given: Eucrisa samples were given

## 2018-06-06 NOTE — PROCEDURE: OTHER
Note Text (......Xxx Chief Complaint.): This diagnosis correlates with the
Other (Free Text): Allergy was unable to find a true cause and she hasn’t tolerated gabapentin or mirtazipine.\\n\\nNo primary lesions today. We will try menthol and pramoxine as well as eucrisa samples
Detail Level: Simple

## 2018-06-27 ENCOUNTER — APPOINTMENT (RX ONLY)
Dept: URBAN - METROPOLITAN AREA CLINIC 24 | Facility: CLINIC | Age: 83
Setting detail: DERMATOLOGY
End: 2018-06-27

## 2018-06-27 DIAGNOSIS — L82.1 OTHER SEBORRHEIC KERATOSIS: ICD-10-CM

## 2018-06-27 DIAGNOSIS — L30.9 DERMATITIS, UNSPECIFIED: ICD-10-CM

## 2018-06-27 DIAGNOSIS — I87.2 VENOUS INSUFFICIENCY (CHRONIC) (PERIPHERAL): ICD-10-CM

## 2018-06-27 PROBLEM — M12.9 ARTHROPATHY, UNSPECIFIED: Status: ACTIVE | Noted: 2018-06-27

## 2018-06-27 PROBLEM — E78.5 HYPERLIPIDEMIA, UNSPECIFIED: Status: ACTIVE | Noted: 2018-06-27

## 2018-06-27 PROBLEM — E03.9 HYPOTHYROIDISM, UNSPECIFIED: Status: ACTIVE | Noted: 2018-06-27

## 2018-06-27 PROBLEM — I10 ESSENTIAL (PRIMARY) HYPERTENSION: Status: ACTIVE | Noted: 2018-06-27

## 2018-06-27 PROBLEM — Z85.3 PERSONAL HISTORY OF MALIGNANT NEOPLASM OF BREAST: Status: ACTIVE | Noted: 2018-06-27

## 2018-06-27 PROCEDURE — ? COUNSELING

## 2018-06-27 PROCEDURE — ? OTHER

## 2018-06-27 PROCEDURE — 99213 OFFICE O/P EST LOW 20 MIN: CPT

## 2018-06-27 PROCEDURE — ? TREATMENT REGIMEN

## 2018-06-27 ASSESSMENT — LOCATION SIMPLE DESCRIPTION DERM
LOCATION SIMPLE: RIGHT FOREARM
LOCATION SIMPLE: ABDOMEN
LOCATION SIMPLE: LEFT PRETIBIAL REGION
LOCATION SIMPLE: RIGHT PRETIBIAL REGION
LOCATION SIMPLE: LEFT FOREARM
LOCATION SIMPLE: LEFT UPPER BACK
LOCATION SIMPLE: RIGHT UPPER BACK
LOCATION SIMPLE: CHEST
LOCATION SIMPLE: POSTERIOR NECK

## 2018-06-27 ASSESSMENT — LOCATION DETAILED DESCRIPTION DERM
LOCATION DETAILED: LEFT PROXIMAL DORSAL FOREARM
LOCATION DETAILED: SUBXIPHOID
LOCATION DETAILED: RIGHT PROXIMAL PRETIBIAL REGION
LOCATION DETAILED: RIGHT DISTAL PRETIBIAL REGION
LOCATION DETAILED: RIGHT PROXIMAL DORSAL FOREARM
LOCATION DETAILED: LEFT LATERAL TRAPEZIAL NECK
LOCATION DETAILED: LEFT PROXIMAL PRETIBIAL REGION
LOCATION DETAILED: LEFT LATERAL SUPERIOR CHEST
LOCATION DETAILED: LEFT DISTAL RADIAL DORSAL FOREARM
LOCATION DETAILED: LEFT MID-UPPER BACK
LOCATION DETAILED: LEFT DISTAL PRETIBIAL REGION
LOCATION DETAILED: RIGHT DISTAL DORSAL FOREARM
LOCATION DETAILED: RIGHT SUPERIOR UPPER BACK

## 2018-06-27 ASSESSMENT — LOCATION ZONE DERM
LOCATION ZONE: NECK
LOCATION ZONE: ARM
LOCATION ZONE: TRUNK
LOCATION ZONE: LEG

## 2018-06-27 NOTE — PROCEDURE: TREATMENT REGIMEN
Detail Level: Zone
Initiate Treatment: May have to restart wearing compression stockings to help with the swelling.
Otc Regimen: Recommended Sarna lotion, Shayne’s vapor rub (to most itchy places) cerave anti itch lotion.
Continue Regimen: TAC as needed, atopic precautions
Samples Given: Eucrisa samples were given
Plan: Discussed phototherapy to possible help with Pruritus. Also discussed this could be a chronic condition and there would be ways to help but not cure.

## 2018-06-27 NOTE — PROCEDURE: OTHER
Detail Level: Simple
Note Text (......Xxx Chief Complaint.): This diagnosis correlates with the
Other (Free Text): Allergy was unable to find a true cause and she hasn’t tolerated gabapentin or mirtazipine.\\n\\nNo primary lesions today. We will try menthol and pramoxine as well as eucrisa samples\\n\\nContinue Shayne’s vapor rub

## 2018-07-23 ENCOUNTER — APPOINTMENT (RX ONLY)
Dept: URBAN - METROPOLITAN AREA CLINIC 24 | Facility: CLINIC | Age: 83
Setting detail: DERMATOLOGY
End: 2018-07-23

## 2018-07-23 DIAGNOSIS — L82.1 OTHER SEBORRHEIC KERATOSIS: ICD-10-CM

## 2018-07-23 DIAGNOSIS — L57.0 ACTINIC KERATOSIS: ICD-10-CM

## 2018-07-23 DIAGNOSIS — L30.9 DERMATITIS, UNSPECIFIED: ICD-10-CM | Status: IMPROVED

## 2018-07-23 PROCEDURE — 99213 OFFICE O/P EST LOW 20 MIN: CPT | Mod: 25

## 2018-07-23 PROCEDURE — ? TREATMENT REGIMEN

## 2018-07-23 PROCEDURE — ? OTHER

## 2018-07-23 PROCEDURE — 17000 DESTRUCT PREMALG LESION: CPT

## 2018-07-23 PROCEDURE — ? COUNSELING

## 2018-07-23 PROCEDURE — ? LIQUID NITROGEN

## 2018-07-23 ASSESSMENT — LOCATION SIMPLE DESCRIPTION DERM
LOCATION SIMPLE: LEFT EYEBROW
LOCATION SIMPLE: RIGHT PRETIBIAL REGION
LOCATION SIMPLE: RIGHT FOREARM
LOCATION SIMPLE: LEFT FOREARM
LOCATION SIMPLE: POSTERIOR NECK
LOCATION SIMPLE: RIGHT UPPER BACK
LOCATION SIMPLE: CHEST
LOCATION SIMPLE: LEFT UPPER BACK
LOCATION SIMPLE: LEFT PRETIBIAL REGION

## 2018-07-23 ASSESSMENT — LOCATION DETAILED DESCRIPTION DERM
LOCATION DETAILED: LEFT PROXIMAL DORSAL FOREARM
LOCATION DETAILED: LEFT MID-UPPER BACK
LOCATION DETAILED: LEFT DISTAL RADIAL DORSAL FOREARM
LOCATION DETAILED: LEFT LATERAL TRAPEZIAL NECK
LOCATION DETAILED: RIGHT DISTAL DORSAL FOREARM
LOCATION DETAILED: LEFT DISTAL PRETIBIAL REGION
LOCATION DETAILED: RIGHT PROXIMAL DORSAL FOREARM
LOCATION DETAILED: RIGHT DISTAL PRETIBIAL REGION
LOCATION DETAILED: RIGHT SUPERIOR UPPER BACK
LOCATION DETAILED: LEFT LATERAL EYEBROW
LOCATION DETAILED: LEFT LATERAL SUPERIOR CHEST

## 2018-07-23 ASSESSMENT — LOCATION ZONE DERM
LOCATION ZONE: TRUNK
LOCATION ZONE: ARM
LOCATION ZONE: FACE
LOCATION ZONE: NECK
LOCATION ZONE: LEG

## 2018-07-23 NOTE — PROCEDURE: TREATMENT REGIMEN
Samples Given: Eucrisa samples were given
Plan: Discussed phototherapy to possible help with Pruritus. Also discussed this could be a chronic condition and there would be ways to help but not cure.
Otc Regimen: Recommended Sarna lotion, Shayne’s vapor rub (to most itchy places) cerave anti itch lotion.
Detail Level: Zone

## 2018-07-23 NOTE — PROCEDURE: OTHER
Other (Free Text): Allergy was unable to find a true cause and she hasn’t tolerated gabapentin or mirtazipine.\\n\\nNo primary lesions today. We will try menthol and pramoxine as well as eucrisa samples\\n\\nContinue Shayne’s vapor rub
Note Text (......Xxx Chief Complaint.): This diagnosis correlates with the
Detail Level: Simple

## 2019-02-01 ENCOUNTER — APPOINTMENT (RX ONLY)
Dept: URBAN - METROPOLITAN AREA CLINIC 24 | Facility: CLINIC | Age: 84
Setting detail: DERMATOLOGY
End: 2019-02-01

## 2019-02-01 DIAGNOSIS — D485 NEOPLASM OF UNCERTAIN BEHAVIOR OF SKIN: ICD-10-CM

## 2019-02-01 DIAGNOSIS — L82.1 OTHER SEBORRHEIC KERATOSIS: ICD-10-CM

## 2019-02-01 PROBLEM — E78.5 HYPERLIPIDEMIA, UNSPECIFIED: Status: ACTIVE | Noted: 2019-02-01

## 2019-02-01 PROBLEM — D48.5 NEOPLASM OF UNCERTAIN BEHAVIOR OF SKIN: Status: ACTIVE | Noted: 2019-02-01

## 2019-02-01 PROBLEM — E03.9 HYPOTHYROIDISM, UNSPECIFIED: Status: ACTIVE | Noted: 2019-02-01

## 2019-02-01 PROCEDURE — 11102 TANGNTL BX SKIN SINGLE LES: CPT

## 2019-02-01 PROCEDURE — ? BIOPSY BY SHAVE METHOD

## 2019-02-01 PROCEDURE — ? COUNSELING

## 2019-02-01 PROCEDURE — 99213 OFFICE O/P EST LOW 20 MIN: CPT | Mod: 25

## 2019-02-01 ASSESSMENT — LOCATION DETAILED DESCRIPTION DERM
LOCATION DETAILED: EPIGASTRIC SKIN
LOCATION DETAILED: LEFT MID-UPPER BACK
LOCATION DETAILED: LEFT LATERAL SUPERIOR CHEST
LOCATION DETAILED: LEFT LATERAL TRAPEZIAL NECK
LOCATION DETAILED: LEFT PROXIMAL DORSAL FOREARM
LOCATION DETAILED: RIGHT DISTAL DORSAL FOREARM
LOCATION DETAILED: RIGHT INFERIOR FOREHEAD
LOCATION DETAILED: RIGHT SUPERIOR UPPER BACK

## 2019-02-01 ASSESSMENT — LOCATION ZONE DERM
LOCATION ZONE: NECK
LOCATION ZONE: ARM
LOCATION ZONE: TRUNK
LOCATION ZONE: FACE

## 2019-02-01 ASSESSMENT — LOCATION SIMPLE DESCRIPTION DERM
LOCATION SIMPLE: LEFT UPPER BACK
LOCATION SIMPLE: RIGHT UPPER BACK
LOCATION SIMPLE: CHEST
LOCATION SIMPLE: LEFT FOREARM
LOCATION SIMPLE: POSTERIOR NECK
LOCATION SIMPLE: RIGHT FOREHEAD
LOCATION SIMPLE: ABDOMEN
LOCATION SIMPLE: RIGHT FOREARM

## 2019-02-01 NOTE — PROCEDURE: BIOPSY BY SHAVE METHOD
Additional Anesthesia Volume In Cc (Will Not Render If 0): 0
Silver Nitrate Text: The wound bed was treated with silver nitrate after the biopsy was performed.
Bill [id number] For Specimen Handling/Conveyance To Laboratory?: no
Anesthesia Volume In Cc: 0.5
Biopsy Type: H and E
Dressing: bandage
Hemostasis: Aluminum Chloride
Anesthesia Type: 1% lidocaine with 1:100,000 epinephrine and a 1:6 solution of 8.4% sodium bicarbonate
Wound Care: Petrolatum
Type Of Destruction Used: Curettage
Electrodesiccation And Curettage Text: The wound bed was treated with electrodesiccation and curettage after the biopsy was performed.
Depth Of Biopsy: dermis
Cryotherapy Text: The wound bed was treated with cryotherapy after the biopsy was performed.
Electrodesiccation Text: The wound bed was treated with electrodesiccation after the biopsy was performed.
Detail Level: Detailed
Was A Bandage Applied: Yes
Curettage Text: The wound bed was treated with curettage after the biopsy was performed.
Billing Type: Third-Party Bill
Biopsy Method: Dermablade
Accession #: pc

## 2019-08-02 ENCOUNTER — APPOINTMENT (RX ONLY)
Dept: URBAN - METROPOLITAN AREA CLINIC 24 | Facility: CLINIC | Age: 84
Setting detail: DERMATOLOGY
End: 2019-08-02

## 2019-08-02 DIAGNOSIS — L57.0 ACTINIC KERATOSIS: ICD-10-CM

## 2019-08-02 DIAGNOSIS — L82.1 OTHER SEBORRHEIC KERATOSIS: ICD-10-CM

## 2019-08-02 PROBLEM — E03.9 HYPOTHYROIDISM, UNSPECIFIED: Status: ACTIVE | Noted: 2019-08-02

## 2019-08-02 PROBLEM — Z85.3 PERSONAL HISTORY OF MALIGNANT NEOPLASM OF BREAST: Status: ACTIVE | Noted: 2019-08-02

## 2019-08-02 PROCEDURE — 17000 DESTRUCT PREMALG LESION: CPT

## 2019-08-02 PROCEDURE — 99213 OFFICE O/P EST LOW 20 MIN: CPT | Mod: 25

## 2019-08-02 PROCEDURE — 17003 DESTRUCT PREMALG LES 2-14: CPT

## 2019-08-02 PROCEDURE — ? LIQUID NITROGEN

## 2019-08-02 PROCEDURE — ? COUNSELING

## 2019-08-02 ASSESSMENT — LOCATION DETAILED DESCRIPTION DERM
LOCATION DETAILED: RIGHT SUPERIOR UPPER BACK
LOCATION DETAILED: LEFT PROXIMAL DORSAL FOREARM
LOCATION DETAILED: LEFT FOREHEAD
LOCATION DETAILED: LEFT MID-UPPER BACK
LOCATION DETAILED: LEFT LATERAL TRAPEZIAL NECK
LOCATION DETAILED: LEFT LATERAL SUPERIOR CHEST
LOCATION DETAILED: EPIGASTRIC SKIN
LOCATION DETAILED: LEFT RADIAL DORSAL HAND
LOCATION DETAILED: NASAL ROOT
LOCATION DETAILED: RIGHT DISTAL DORSAL FOREARM

## 2019-08-02 ASSESSMENT — LOCATION SIMPLE DESCRIPTION DERM
LOCATION SIMPLE: LEFT UPPER BACK
LOCATION SIMPLE: LEFT FOREARM
LOCATION SIMPLE: LEFT FOREHEAD
LOCATION SIMPLE: ABDOMEN
LOCATION SIMPLE: POSTERIOR NECK
LOCATION SIMPLE: RIGHT FOREARM
LOCATION SIMPLE: RIGHT UPPER BACK
LOCATION SIMPLE: NOSE
LOCATION SIMPLE: CHEST
LOCATION SIMPLE: LEFT HAND

## 2019-08-02 ASSESSMENT — LOCATION ZONE DERM
LOCATION ZONE: TRUNK
LOCATION ZONE: NECK
LOCATION ZONE: HAND
LOCATION ZONE: ARM
LOCATION ZONE: NOSE
LOCATION ZONE: FACE

## 2019-08-02 NOTE — PROCEDURE: LIQUID NITROGEN
Post-Care Instructions: I reviewed with the patient in detail post-care instructions. Patient is to wear sunprotection, and avoid picking at any of the treated lesions. Pt may apply Vaseline to crusted or scabbing areas.
Render Note In Bullet Format When Appropriate: No
Duration Of Freeze Thaw-Cycle (Seconds): 5
Detail Level: Simple
Consent: The patient's consent was obtained including but not limited to risks of crusting, scabbing, blistering, scarring, darker or lighter pigmentary change, recurrence, incomplete removal and infection.
Number Of Freeze-Thaw Cycles: 3 freeze-thaw cycles

## 2020-02-03 ENCOUNTER — APPOINTMENT (RX ONLY)
Dept: URBAN - METROPOLITAN AREA CLINIC 24 | Facility: CLINIC | Age: 85
Setting detail: DERMATOLOGY
End: 2020-02-03

## 2020-02-03 DIAGNOSIS — D485 NEOPLASM OF UNCERTAIN BEHAVIOR OF SKIN: ICD-10-CM

## 2020-02-03 PROBLEM — D48.5 NEOPLASM OF UNCERTAIN BEHAVIOR OF SKIN: Status: ACTIVE | Noted: 2020-02-03

## 2020-02-03 PROCEDURE — ? OTHER

## 2020-02-03 PROCEDURE — ? BIOPSY BY SHAVE METHOD

## 2020-02-03 PROCEDURE — 11102 TANGNTL BX SKIN SINGLE LES: CPT

## 2020-02-03 ASSESSMENT — LOCATION SIMPLE DESCRIPTION DERM: LOCATION SIMPLE: RIGHT FOREHEAD

## 2020-02-03 ASSESSMENT — LOCATION DETAILED DESCRIPTION DERM: LOCATION DETAILED: RIGHT INFERIOR FOREHEAD

## 2020-02-03 ASSESSMENT — LOCATION ZONE DERM: LOCATION ZONE: FACE

## 2020-02-03 NOTE — PROCEDURE: OTHER
Detail Level: Simple
Other (Free Text): Dr Mercedes also examined the patient she says this continues to grow and darken
Note Text (......Xxx Chief Complaint.): This diagnosis correlates with the

## 2020-02-03 NOTE — PROCEDURE: BIOPSY BY SHAVE METHOD
Curettage Text: The wound bed was treated with curettage after the biopsy was performed.
Dressing: bandage
Was A Bandage Applied: Yes
Bill [id number] For Specimen Handling/Conveyance To Laboratory?: no
Biopsy Type: H and E
X Size Of Lesion In Cm: 0
Electrodesiccation And Curettage Text: The wound bed was treated with electrodesiccation and curettage after the biopsy was performed.
Type Of Destruction Used: Curettage
Wound Care: Petrolatum
Depth Of Biopsy: dermis
Cryotherapy Text: The wound bed was treated with cryotherapy after the biopsy was performed.
Anesthesia Volume In Cc: 0.5
Accession #: PC
Silver Nitrate Text: The wound bed was treated with silver nitrate after the biopsy was performed.
Hemostasis: Aluminum Chloride
Biopsy Method: Dermablade
Anesthesia Type: 1% lidocaine with 1:100,000 epinephrine and a 1:6 solution of 8.4% sodium bicarbonate
Billing Type: Third-Party Bill
Detail Level: Detailed
Electrodesiccation Text: The wound bed was treated with electrodesiccation after the biopsy was performed.

## (undated) DEVICE — SYR 50ML LR LCK 1ML GRAD NSAF --

## (undated) DEVICE — (D)SYR 10ML 1/5ML GRAD NSAF -- PKGING CHANGE USE ITEM 338027

## (undated) DEVICE — X-LARGE COTTON GLOVE: Brand: DEROYAL

## (undated) DEVICE — T4 HOOD

## (undated) DEVICE — FAN SPRAY KIT: Brand: PULSAVAC®

## (undated) DEVICE — SUTURE PDS II SZ 1 L54IN ABSRB VLT L65MM TP-1 1/2 CIR Z879G

## (undated) DEVICE — 3M™ STERI-DRAPE™ INSTRUMENT POUCH 1018: Brand: STERI-DRAPE™

## (undated) DEVICE — DRAPE,HIP,W/POUCHES,STERILE: Brand: MEDLINE

## (undated) DEVICE — DRAPE TWL SURG 16X26IN BLU ORB04] ALLCARE INC]

## (undated) DEVICE — SUT VCRL + 2-0 27IN CP1 VIO --

## (undated) DEVICE — SOLUTION IRRIG 3000ML 0.9% SOD CHL FLX CONT 0797208] ICU MEDICAL INC]

## (undated) DEVICE — BUTTON SWITCH PENCIL BLADE ELECTRODE, HOLSTER: Brand: EDGE

## (undated) DEVICE — TRAY CATH 16F DRN BG LTX -- CONVERT TO ITEM 363158

## (undated) DEVICE — SUT VCRL + 3-0 27IN X1 VIO --

## (undated) DEVICE — REM POLYHESIVE ADULT PATIENT RETURN ELECTRODE: Brand: VALLEYLAB

## (undated) DEVICE — SKIN STAPLER: Brand: SIGNET

## (undated) DEVICE — MCLASS OSCILLATING SAW BLADE 19 X 1.27 (0.050") X 90 MM: Brand: MCLASS

## (undated) DEVICE — MEDI-VAC YANKAUER SUCTION HANDLE W/BULBOUS TIP: Brand: CARDINAL HEALTH

## (undated) DEVICE — BIPOLAR SEALER 23-112-1 AQM 6.0: Brand: AQUAMANTYS ®

## (undated) DEVICE — SUTURE ETHBND EXCEL SZ 5 L30IN NONABSORBABLE GRN L40MM V-37 MB66G

## (undated) DEVICE — SURGICAL PROCEDURE PACK TOT HIP CDS

## (undated) DEVICE — TRAY PREP DRY W/ PREM GLV 2 APPL 6 SPNG 2 UNDPD 1 OVERWRAP

## (undated) DEVICE — SOLUTION IV 1000ML 0.9% SOD CHL

## (undated) DEVICE — 3000CC GUARDIAN II: Brand: GUARDIAN

## (undated) DEVICE — (D)PREP SKN CHLRAPRP APPL 26ML -- CONVERT TO ITEM 371833

## (undated) DEVICE — 3M™ STERI-DRAPE™ INCISE DRAPE, XL 1051: Brand: STERI-DRAPE™

## (undated) DEVICE — STOCKINETTE TUBE 6X48 -- MEDICHOICE

## (undated) DEVICE — Z DISCONTINUED USE 2744636  DRESSING AQUACEL 14 IN ALG W3.5XL14IN POLYUR FLM CVR W/ HYDRCOLL

## (undated) DEVICE — SYR LR LCK 1ML GRAD NSAF 30ML --

## (undated) DEVICE — 2000CC GUARDIAN II: Brand: GUARDIAN

## (undated) DEVICE — SOLUTION IV DEXTROSE/SALINE 5%-0.9% 500ML - 500ML

## (undated) DEVICE — STAPLER SKIN SQ 30 ABSRB STPL DISP INSORB